# Patient Record
Sex: MALE | Race: WHITE | NOT HISPANIC OR LATINO
[De-identification: names, ages, dates, MRNs, and addresses within clinical notes are randomized per-mention and may not be internally consistent; named-entity substitution may affect disease eponyms.]

---

## 2023-12-11 PROBLEM — Z00.00 ENCOUNTER FOR PREVENTIVE HEALTH EXAMINATION: Status: ACTIVE | Noted: 2023-12-11

## 2023-12-18 ENCOUNTER — APPOINTMENT (OUTPATIENT)
Dept: FAMILY MEDICINE | Facility: CLINIC | Age: 78
End: 2023-12-18
Payer: MEDICARE

## 2023-12-18 ENCOUNTER — NON-APPOINTMENT (OUTPATIENT)
Age: 78
End: 2023-12-18

## 2023-12-18 VITALS
HEIGHT: 70 IN | SYSTOLIC BLOOD PRESSURE: 91 MMHG | OXYGEN SATURATION: 96 % | HEART RATE: 82 BPM | RESPIRATION RATE: 17 BRPM | TEMPERATURE: 98.3 F | BODY MASS INDEX: 29.78 KG/M2 | DIASTOLIC BLOOD PRESSURE: 60 MMHG | WEIGHT: 208 LBS

## 2023-12-18 DIAGNOSIS — N42.9 DISORDER OF PROSTATE, UNSPECIFIED: ICD-10-CM

## 2023-12-18 DIAGNOSIS — E78.00 PURE HYPERCHOLESTEROLEMIA, UNSPECIFIED: ICD-10-CM

## 2023-12-18 DIAGNOSIS — I44.7 LEFT BUNDLE-BRANCH BLOCK, UNSPECIFIED: ICD-10-CM

## 2023-12-18 DIAGNOSIS — N13.9 OBSTRUCTIVE AND REFLUX UROPATHY, UNSPECIFIED: ICD-10-CM

## 2023-12-18 DIAGNOSIS — R91.8 OTHER NONSPECIFIC ABNORMAL FINDING OF LUNG FIELD: ICD-10-CM

## 2023-12-18 DIAGNOSIS — Z12.11 ENCOUNTER FOR SCREENING FOR MALIGNANT NEOPLASM OF COLON: ICD-10-CM

## 2023-12-18 DIAGNOSIS — N20.0 CALCULUS OF KIDNEY: ICD-10-CM

## 2023-12-18 DIAGNOSIS — I51.7 CARDIOMEGALY: ICD-10-CM

## 2023-12-18 DIAGNOSIS — G47.00 INSOMNIA, UNSPECIFIED: ICD-10-CM

## 2023-12-18 DIAGNOSIS — Z86.79 PERSONAL HISTORY OF OTHER DISEASES OF THE CIRCULATORY SYSTEM: ICD-10-CM

## 2023-12-18 DIAGNOSIS — T63.331A: ICD-10-CM

## 2023-12-18 DIAGNOSIS — M79.89 OTHER SPECIFIED SOFT TISSUE DISORDERS: ICD-10-CM

## 2023-12-18 PROCEDURE — 99387 INIT PM E/M NEW PAT 65+ YRS: CPT | Mod: GY

## 2023-12-18 RX ORDER — ALBUTEROL SULFATE 90 UG/1
108 (90 BASE) INHALANT RESPIRATORY (INHALATION)
Qty: 3 | Refills: 3 | Status: ACTIVE | COMMUNITY
Start: 2023-12-18 | End: 1900-01-01

## 2023-12-18 RX ORDER — FLUTICASONE FUROATE, UMECLIDINIUM BROMIDE AND VILANTEROL TRIFENATATE 200; 62.5; 25 UG/1; UG/1; UG/1
200-62.5-25 POWDER RESPIRATORY (INHALATION) DAILY
Qty: 1 | Refills: 3 | Status: ACTIVE | COMMUNITY
Start: 2023-12-18 | End: 1900-01-01

## 2023-12-18 RX ORDER — ROSUVASTATIN CALCIUM 10 MG/1
10 TABLET, FILM COATED ORAL
Qty: 90 | Refills: 3 | Status: ACTIVE | COMMUNITY
Start: 2023-12-18 | End: 1900-01-01

## 2023-12-21 NOTE — HISTORY OF PRESENT ILLNESS
[de-identified] : 78-year-old male presents for establishment of care for health maintenance and physical examination. 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 Patient is a returning resident from [place] who recently lost his wife and moved back. Patient suffers from discomfort in foot due to prior ankle fusion surgery, recurrent neuropathy. He treats most his ailments as needed. The patient is also having sleep related issues and taking pills for the same. He also suffers from high cholesterol for which he takes medication. Patient had a brain bleed in 2015 for which he spent a week in the ICU. He also has a history of transitional zone prostate disease. Patient also reported a history of gout. Patient underwent a PET scan indicating two lung nodules that need CT Chest in three months. Report suggests a 2.0 cm focus of moderate abnormal FDG uptake mid gland right transitional zone prostate, correlate with PSA and Prostate MRI. Patient also has suggestion to obtain a renal ultrasound or MRI suggested due to a 3.7 cm septated cyst in the posterior mid to lower pole cortex of the right kidney.

## 2024-01-04 ENCOUNTER — RESULT REVIEW (OUTPATIENT)
Age: 79
End: 2024-01-04

## 2024-01-04 ENCOUNTER — OUTPATIENT (OUTPATIENT)
Dept: OUTPATIENT SERVICES | Facility: HOSPITAL | Age: 79
LOS: 1 days | End: 2024-01-04
Payer: MEDICARE

## 2024-01-04 ENCOUNTER — APPOINTMENT (OUTPATIENT)
Dept: MRI IMAGING | Facility: CLINIC | Age: 79
End: 2024-01-04
Payer: MEDICARE

## 2024-01-04 DIAGNOSIS — N13.9 OBSTRUCTIVE AND REFLUX UROPATHY, UNSPECIFIED: ICD-10-CM

## 2024-01-04 DIAGNOSIS — N42.89 OTHER SPECIFIED DISORDERS OF PROSTATE: ICD-10-CM

## 2024-01-04 DIAGNOSIS — N42.9 DISORDER OF PROSTATE, UNSPECIFIED: ICD-10-CM

## 2024-01-04 DIAGNOSIS — R97.20 ELEVATED PROSTATE SPECIFIC ANTIGEN [PSA]: ICD-10-CM

## 2024-01-04 PROCEDURE — A9585: CPT

## 2024-01-04 PROCEDURE — 72197 MRI PELVIS W/O & W/DYE: CPT

## 2024-01-04 PROCEDURE — 76498P: CUSTOM | Mod: 26

## 2024-01-04 PROCEDURE — 72197 MRI PELVIS W/O & W/DYE: CPT | Mod: 26

## 2024-01-04 PROCEDURE — 76498 UNLISTED MR PROCEDURE: CPT

## 2024-01-08 ENCOUNTER — RX RENEWAL (OUTPATIENT)
Age: 79
End: 2024-01-08

## 2024-01-10 ENCOUNTER — RX RENEWAL (OUTPATIENT)
Age: 79
End: 2024-01-10

## 2024-01-12 ENCOUNTER — APPOINTMENT (OUTPATIENT)
Dept: CT IMAGING | Facility: HOSPITAL | Age: 79
End: 2024-01-12
Payer: MEDICARE

## 2024-01-12 ENCOUNTER — RESULT REVIEW (OUTPATIENT)
Age: 79
End: 2024-01-12

## 2024-01-12 ENCOUNTER — OUTPATIENT (OUTPATIENT)
Dept: OUTPATIENT SERVICES | Facility: HOSPITAL | Age: 79
LOS: 1 days | End: 2024-01-12
Payer: MEDICARE

## 2024-01-12 ENCOUNTER — APPOINTMENT (OUTPATIENT)
Dept: ULTRASOUND IMAGING | Facility: HOSPITAL | Age: 79
End: 2024-01-12
Payer: MEDICARE

## 2024-01-12 DIAGNOSIS — R91.8 OTHER NONSPECIFIC ABNORMAL FINDING OF LUNG FIELD: ICD-10-CM

## 2024-01-12 DIAGNOSIS — I51.7 CARDIOMEGALY: ICD-10-CM

## 2024-01-12 PROCEDURE — 76770 US EXAM ABDO BACK WALL COMP: CPT | Mod: 26

## 2024-01-12 PROCEDURE — 76770 US EXAM ABDO BACK WALL COMP: CPT

## 2024-01-12 PROCEDURE — 71250 CT THORAX DX C-: CPT | Mod: 26

## 2024-01-12 PROCEDURE — 71250 CT THORAX DX C-: CPT

## 2024-01-14 RX ORDER — TRAMADOL HYDROCHLORIDE 50 MG/1
50 TABLET, COATED ORAL
Qty: 30 | Refills: 0 | Status: ACTIVE | COMMUNITY
Start: 2023-12-18 | End: 1900-01-01

## 2024-01-17 LAB
ALBUMIN SERPL ELPH-MCNC: 4.1 G/DL
ALP BLD-CCNC: 69 U/L
ALT SERPL-CCNC: 14 U/L
ANION GAP SERPL CALC-SCNC: 10 MMOL/L
AST SERPL-CCNC: 20 U/L
BILIRUB SERPL-MCNC: 0.5 MG/DL
BUN SERPL-MCNC: 22 MG/DL
CALCIUM SERPL-MCNC: 9.4 MG/DL
CHLORIDE SERPL-SCNC: 105 MMOL/L
CHOLEST SERPL-MCNC: 174 MG/DL
CO2 SERPL-SCNC: 24 MMOL/L
CREAT SERPL-MCNC: 0.89 MG/DL
EGFR: 89 ML/MIN/1.73M2
GLUCOSE SERPL-MCNC: 103 MG/DL
HCT VFR BLD CALC: 44.3 %
HDLC SERPL-MCNC: 53 MG/DL
HGB BLD-MCNC: 14.4 G/DL
LDLC SERPL CALC-MCNC: 107 MG/DL
MCHC RBC-ENTMCNC: 29.8 PG
MCHC RBC-ENTMCNC: 32.5 GM/DL
MCV RBC AUTO: 91.5 FL
NONHDLC SERPL-MCNC: 121 MG/DL
PLATELET # BLD AUTO: 229 K/UL
POTASSIUM SERPL-SCNC: 4.8 MMOL/L
PROT SERPL-MCNC: 6.4 G/DL
PSA FREE FLD-MCNC: 20 %
PSA FREE SERPL-MCNC: 0.33 NG/ML
PSA SERPL-MCNC: 1.61 NG/ML
RBC # BLD: 4.84 M/UL
RBC # FLD: 13.2 %
SODIUM SERPL-SCNC: 139 MMOL/L
TRIGL SERPL-MCNC: 75 MG/DL
TSH SERPL-ACNC: 0.93 UIU/ML
WBC # FLD AUTO: 9.27 K/UL

## 2024-02-05 ENCOUNTER — APPOINTMENT (OUTPATIENT)
Dept: UROLOGY | Facility: CLINIC | Age: 79
End: 2024-02-05
Payer: MEDICARE

## 2024-02-05 VITALS
BODY MASS INDEX: 29.06 KG/M2 | OXYGEN SATURATION: 96 % | HEIGHT: 70 IN | WEIGHT: 203 LBS | DIASTOLIC BLOOD PRESSURE: 75 MMHG | TEMPERATURE: 97.9 F | SYSTOLIC BLOOD PRESSURE: 137 MMHG | HEART RATE: 62 BPM

## 2024-02-05 DIAGNOSIS — N28.1 CYST OF KIDNEY, ACQUIRED: ICD-10-CM

## 2024-02-05 DIAGNOSIS — Z86.39 PERSONAL HISTORY OF OTHER ENDOCRINE, NUTRITIONAL AND METABOLIC DISEASE: ICD-10-CM

## 2024-02-05 DIAGNOSIS — R35.1 NOCTURIA: ICD-10-CM

## 2024-02-05 DIAGNOSIS — Z86.69 PERSONAL HISTORY OF OTHER DISEASES OF THE NERVOUS SYSTEM AND SENSE ORGANS: ICD-10-CM

## 2024-02-05 PROCEDURE — 99204 OFFICE O/P NEW MOD 45 MIN: CPT

## 2024-02-05 NOTE — PHYSICAL EXAM
[Normal Appearance] : normal appearance [Well Groomed] : well groomed [General Appearance - In No Acute Distress] : no acute distress [Edema] : no peripheral edema [Respiration, Rhythm And Depth] : normal respiratory rhythm and effort [Exaggerated Use Of Accessory Muscles For Inspiration] : no accessory muscle use [Abdomen Soft] : soft [Abdomen Tenderness] : non-tender [Costovertebral Angle Tenderness] : no ~M costovertebral angle tenderness [Normal Station and Gait] : the gait and station were normal for the patient's age [] : no rash [No Focal Deficits] : no focal deficits [Oriented To Time, Place, And Person] : oriented to person, place, and time [Affect] : the affect was normal [Mood] : the mood was normal [No Palpable Adenopathy] : no palpable adenopathy [de-identified] : pvr- 100-115 ml  [de-identified] : uses cane

## 2024-02-05 NOTE — HISTORY OF PRESENT ILLNESS
[FreeTextEntry1] : patient here with son  was to see  last year but postponed due to wife death  reason for visit - push to void rhianna at night and relaxes to void - 2-3 x at night  no day issues with void , no dysuria, no hematuira , flow varies- feels empty after void  had to get cheslt ct scan last year and prostae was seen  f/u MRI of prostate done and was told abnl and needed f/u  denies any procedures or meds for protste no fh of prostate cancer

## 2024-02-05 NOTE — ASSESSMENT
[FreeTextEntry1] : patient with  chest PETS scan done ( neg for chest ) but abnl pelvis -- this led to MRI of prosate 1- refer to colleague for further eval of abn PIRAD 4 MRI wiht normal psa 1.6 and prostate vol 27 ml  2- abnl SOPHIE with complex cyst -- recommend CT +/- contrast and then if Bosniak 2 or 2 F - cont annual SOPHIE  3-LUTS -an no bilat hydro -- no meds needed - discussed UDS to evla bladder functino but not bothered - will cont to follow  4- check urine today

## 2024-02-06 LAB
APPEARANCE: CLEAR
BACTERIA: NEGATIVE /HPF
BILIRUBIN URINE: NEGATIVE
BLOOD URINE: NEGATIVE
CAST: 0 /LPF
COLOR: YELLOW
EPITHELIAL CELLS: 0 /HPF
GLUCOSE QUALITATIVE U: NEGATIVE MG/DL
KETONES URINE: NEGATIVE MG/DL
LEUKOCYTE ESTERASE URINE: NEGATIVE
MICROSCOPIC-UA: NORMAL
NITRITE URINE: NEGATIVE
PH URINE: 7
PROTEIN URINE: NORMAL MG/DL
RED BLOOD CELLS URINE: 2 /HPF
SPECIFIC GRAVITY URINE: 1.02
UROBILINOGEN URINE: 0.2 MG/DL
WHITE BLOOD CELLS URINE: 0 /HPF

## 2024-02-07 LAB — BACTERIA UR CULT: NORMAL

## 2024-02-12 ENCOUNTER — APPOINTMENT (OUTPATIENT)
Dept: CT IMAGING | Facility: HOSPITAL | Age: 79
End: 2024-02-12
Payer: MEDICARE

## 2024-02-12 ENCOUNTER — OUTPATIENT (OUTPATIENT)
Dept: OUTPATIENT SERVICES | Facility: HOSPITAL | Age: 79
LOS: 1 days | End: 2024-02-12
Payer: MEDICARE

## 2024-02-12 DIAGNOSIS — N28.1 CYST OF KIDNEY, ACQUIRED: ICD-10-CM

## 2024-02-12 PROCEDURE — 74170 CT ABD WO CNTRST FLWD CNTRST: CPT | Mod: 26

## 2024-02-12 PROCEDURE — 74170 CT ABD WO CNTRST FLWD CNTRST: CPT

## 2024-02-27 ENCOUNTER — APPOINTMENT (OUTPATIENT)
Dept: UROLOGY | Facility: CLINIC | Age: 79
End: 2024-02-27
Payer: MEDICARE

## 2024-02-27 VITALS
WEIGHT: 203 LBS | OXYGEN SATURATION: 96 % | BODY MASS INDEX: 29.06 KG/M2 | HEIGHT: 70 IN | RESPIRATION RATE: 17 BRPM | SYSTOLIC BLOOD PRESSURE: 133 MMHG | TEMPERATURE: 97.4 F | HEART RATE: 74 BPM | DIASTOLIC BLOOD PRESSURE: 77 MMHG

## 2024-02-27 PROCEDURE — 99214 OFFICE O/P EST MOD 30 MIN: CPT

## 2024-02-27 NOTE — HISTORY OF PRESENT ILLNESS
[FreeTextEntry1] : 79-year-old male who presents for follow-up of abnormal imaging  #Abnormal MRI Had a PET CT for a lung lesion in July 2023, which showed a hypermetabolic lesion in the prostate. This was in [place] in July - he has now moved to [place].  MRI prostate January 2024: 27 cc prostate, PI-RADS 4 lesion right anterior mid gland peripheral zone PSA January 2024: 1.61 Referred for consideration of biopsy No family history of prostate cancer Was in [place].  No known exposure to agent orange  #Renal cysts CT 2/2024 - Bosniak 2 cyst, 4/4 cm RK  #LUTS Nocturia x 2-3. Minimally bothered UA 2/2024 - 2 rbc/hpf

## 2024-02-27 NOTE — PHYSICAL EXAM
[Normal Appearance] : normal appearance [Edema] : no peripheral edema [] : no respiratory distress [Bowel Sounds] : normal bowel sounds [Abdomen Tenderness] : non-tender [Urethral Meatus] : meatus normal [Epididymis] : the epididymides were normal [Testes Tenderness] : no tenderness of the testes [Prostate Enlargement] : the prostate was not enlarged [Prostate Tenderness] : the prostate was not tender [No Prostate Nodules] : no prostate nodules

## 2024-02-27 NOTE — ASSESSMENT
[FreeTextEntry1] : 79-year male presents with the following:  #Abnormal MRI pelvis I discussed that the definitive way to evaluate for prostate cancer is to perform a prostate biopsy.  I described the procedure in detail.  I described that this involves placing a probe into the rectum, and then placing a biopsy needle through the skin between the scrotum and the anus (the perineum).  I discussed that we usually sample 12 different regions in the prostate, in addition to the suspicious lesion.  I described the risks of the procedure, including retention, pain, infection.  I described that while infection after prostate biopsy is a significant concern, using the transperineal technique decreases this risk. Also discussed the possibility of transient-worsening of his erections following this biopsy. At this time, he would like to proceed with a transperineal prostate biopsy - Schedule for TP fusion prostate biopsy  #Wade cyst - Annual US

## 2024-03-04 ENCOUNTER — APPOINTMENT (OUTPATIENT)
Dept: DERMATOLOGY | Facility: CLINIC | Age: 79
End: 2024-03-04
Payer: MEDICARE

## 2024-03-04 DIAGNOSIS — R21 RASH AND OTHER NONSPECIFIC SKIN ERUPTION: ICD-10-CM

## 2024-03-04 DIAGNOSIS — L20.9 ATOPIC DERMATITIS, UNSPECIFIED: ICD-10-CM

## 2024-03-04 PROCEDURE — 99204 OFFICE O/P NEW MOD 45 MIN: CPT

## 2024-03-04 NOTE — PHYSICAL EXAM
[FreeTextEntry3] : AAOx3, pleasant, NAD, no visual lymphadenopathy hair, scalp, face, nose, eyelids, ears, lips, oropharynx, neck, chest, abdomen, back, right arm, left arm, nails, and hands examined with all normal findings, pertinent findings include:  diffuse xerosis on arms and legs

## 2024-03-04 NOTE — ASSESSMENT
[FreeTextEntry1] : atopic dermatitis -education -gentle skin care reviewed TAC BID PRN; SEd discussed dupixent defers

## 2024-03-08 ENCOUNTER — OUTPATIENT (OUTPATIENT)
Dept: OUTPATIENT SERVICES | Facility: HOSPITAL | Age: 79
LOS: 1 days | End: 2024-03-08
Payer: SELF-PAY

## 2024-03-08 DIAGNOSIS — Z00.8 ENCOUNTER FOR OTHER GENERAL EXAMINATION: ICD-10-CM

## 2024-03-08 PROCEDURE — C8001: CPT

## 2024-03-13 ENCOUNTER — APPOINTMENT (OUTPATIENT)
Dept: NEUROLOGY | Facility: CLINIC | Age: 79
End: 2024-03-13
Payer: MEDICARE

## 2024-03-13 ENCOUNTER — EMERGENCY (EMERGENCY)
Facility: HOSPITAL | Age: 79
LOS: 1 days | Discharge: HOME | End: 2024-03-13
Attending: EMERGENCY MEDICINE | Admitting: STUDENT IN AN ORGANIZED HEALTH CARE EDUCATION/TRAINING PROGRAM
Payer: MEDICARE

## 2024-03-13 VITALS
BODY MASS INDEX: 29.06 KG/M2 | DIASTOLIC BLOOD PRESSURE: 80 MMHG | HEIGHT: 70 IN | SYSTOLIC BLOOD PRESSURE: 124 MMHG | WEIGHT: 203 LBS | OXYGEN SATURATION: 97 % | HEART RATE: 70 BPM | TEMPERATURE: 98.3 F

## 2024-03-13 VITALS
SYSTOLIC BLOOD PRESSURE: 152 MMHG | TEMPERATURE: 98 F | HEIGHT: 70 IN | RESPIRATION RATE: 17 BRPM | HEART RATE: 94 BPM | WEIGHT: 205.03 LBS | OXYGEN SATURATION: 98 % | DIASTOLIC BLOOD PRESSURE: 89 MMHG

## 2024-03-13 PROCEDURE — G2211 COMPLEX E/M VISIT ADD ON: CPT

## 2024-03-13 PROCEDURE — 99284 EMERGENCY DEPT VISIT MOD MDM: CPT

## 2024-03-13 PROCEDURE — 99283 EMERGENCY DEPT VISIT LOW MDM: CPT

## 2024-03-13 PROCEDURE — 99205 OFFICE O/P NEW HI 60 MIN: CPT

## 2024-03-13 RX ADMIN — Medication 100 MILLIGRAM(S): at 17:40

## 2024-03-13 NOTE — ED PROVIDER NOTE - CLINICAL SUMMARY MEDICAL DECISION MAKING FREE TEXT BOX
Cellulitis This patient presents with initial presentation of local erythema, warmth, swelling concerning for cellulitis. Sensitivity/pain to light touch around the erythematous area. No lymphangitic spread visible and no fluid pockets or fluctuance concerning for abscess noted. Low concern for osteomyelitis or DVT. No immune compromise, bullae, pain out of proportion, or rapid progression concerning for necrotizing fasciitis. [facility] with follow up with their PMD.

## 2024-03-13 NOTE — ED ADULT TRIAGE NOTE - SPO2 (%)
Dr Nicolette Hammans pt    Pharmacy called in to advise that Carafate script sent for this pt is for liquid, which is too expensive  They would like to know if tablets could be provided to patient since it is cheaper  Please advise  98

## 2024-03-13 NOTE — ED PROVIDER NOTE - NS ED ROS FT
Review of Systems:  	•	CONSTITUTIONAL - no fever, no diaphoresis, no weight change  	•	SKIN - no rash  	•	HEMATOLOGIC - no bleeding, no bruising  	•	EYES - no eye pain, no blurred vision  	•	ENT - no change in hearing, no pain  	•	RESPIRATORY - no shortness of breath, no cough  	•	CARDIAC - no chest pain, no palpitations  	•	GI - no abd pain, no nausea, no vomiting, no diarrhea, no constipation, no bleeding  	•	GENITO-URINARY -no dysuria; no hematuria   	•	MUSCULOSKELETAL - no joint paint, +swelling, +redness  	•	NEUROLOGIC - no weakness, no headache, no anesthesia, no paresthesias  	•	PSYCH - no anxiety, non suicidal, non homicidal, no hallucination, no depression

## 2024-03-13 NOTE — ED PROVIDER NOTE - PATIENT PORTAL LINK FT
You can access the FollowMyHealth Patient Portal offered by [facility] by registering at the following website: [email or web address]. By joining [facility]’s FollowMyHealth portal, you will also be able to view your health information using other applications (apps) compatible with our system.

## 2024-03-13 NOTE — ED PROVIDER NOTE - PHYSICAL EXAMINATION
ATTENDING PHYSICAL EXAM DR. NOEL ***GEN - NAD; well appearing; A+O x3 ***HEAD - NC/AT ***EYES/NOSE - PERRL, EOMI, mucous membranes moist, no discharge ***THROAT: Oral cavity and pharynx normal. No inflammation, swelling, exudate, or lesions.  ***NECK: Neck supple, non-tender without lymphadenopathy, no masses, no thyromegaly.   ***PULMONARY - CTA b/l, symmetric breath sounds. ***CARDIAC -s1s2, RRR, no M,G,R  ***ABDOMEN - +BS, ND, NT, soft, no guarding, no rebound, no masses   ***BACK - no CVA tenderness, Normal  spine ***EXTREMITIES - symmetric pulses, 2+ dp, capillary refill < 2 seconds, no clubbing, no cyanosis, +edema +erythema to medial aspect of lower right ext, above ankle, from ankle, no abscess, +edema without fluctuance ***SKIN - no rash or bruising   ***NEUROLOGIC - alert

## 2024-03-13 NOTE — DISCUSSION/SUMMARY
[FreeTextEntry1] : 79-year-old gentleman who is here for neuropathy however patient has likely abscess in the medial calf.  Patient was advised to go to the emergency room.  Emergency room physician was notified.  When patient returns for follow-up will manage his neuropathy.  In the interim we will have staff try to obtain the records from [place] physician.  I spent the time noted on the day of this patient encounter preparing for, review of medical records,review of pertinent diagnostic studies, providing and documenting the above E/M service and counseling and educate patient on differential, workup, disease course, and treatment/management. Education was provided to the patient during this encounter. All questions and concerns were answered and addressed in detail. The patient verbalized understanding and agreed to plan. Patient was advised to continue to monitor for neurologic symptoms and to notify my office or go to the nearest emergency room if there are any changes. Any orders/referrals and communications were provided as well. Side effects of the above medications were discussed in detail including but not limited to applicable black box warning and teratogenicity as appropriate. Patient was advised to bring previous records to my office.

## 2024-03-13 NOTE — HISTORY OF PRESENT ILLNESS
[FreeTextEntry1] : 79-year-old gentleman who came with no medical records.  Patient had his care previously in [place] and has longstanding neuropathy described as pins-and-needles pain that occurs in both heels.  Patient had a fracture on the right calf and needed bone fusion because it had calcified with the fracture displaced.  Patient has tried oxycodone, Lyrica, gabapentin.  The Lyrica 75 mg as needed along with tramadol 50 mg as needed.  Patient had gauze over his right calf and when he revealed underneath there was erythema and swelling along with white discharge at the wound site.  Patient was advised he should go to the ER for treatment of this and ER was notified.

## 2024-03-13 NOTE — PHYSICAL EXAM
[General Appearance - Alert] : alert [Oriented To Time, Place, And Person] : oriented to person, place, and time [Place] : oriented to place [Person] : oriented to person [Time] : oriented to time [Short Term Intact] : short term memory intact [Fluency] : fluency intact [Current Events] : adequate knowledge of current events [Cranial Nerves Optic (II)] : visual acuity intact bilaterally,  visual fields full to confrontation, pupils equal round and reactive to light [Cranial Nerves Facial (VII)] : face symmetrical [Cranial Nerves Oculomotor (III)] : extraocular motion intact [Cranial Nerves Vestibulocochlear (VIII)] : hearing was intact bilaterally [Cranial Nerves Accessory (XI - Cranial And Spinal)] : head turning and shoulder shrug symmetric [Cranial Nerves Hypoglossal (XII)] : there was no tongue deviation with protrusion [Motor Tone] : muscle tone was normal in all four extremities [Motor Strength] : muscle strength was normal in all four extremities

## 2024-03-13 NOTE — ED ADULT NURSE NOTE - NSFALLUNIVINTERV_ED_ALL_ED
Bed/Stretcher in lowest position, wheels locked, appropriate side rails in place/Call bell, personal items and telephone in reach/Instruct patient to call for assistance before getting out of bed/chair/stretcher/Non-slip footwear applied when patient is off stretcher/[place] to call system/Physically safe environment - no spills, clutter or unnecessary equipment/Purposeful proactive rounding/Room/bathroom lighting operational, light cord in reach

## 2024-03-13 NOTE — ED PROVIDER NOTE - NSFOLLOWUPINSTRUCTIONS_ED_ALL_ED_FT
Keep elevated is much as possible above the level of the heart, start antibiotics as prescribed.    English    Cellulitis, Adult  A person's legs and feet. One leg is normal and the other leg is affected by cellulitis.  Cellulitis is a skin infection. The infected area is often warm, red, swollen, and sore. It occurs most often on the legs, feet, and toes, but can happen on any part of the body.    This condition can be life-threatening without treatment. It is very important to get treated right away.    What are the causes?  This condition is caused by bacteria. The bacteria enter through a break in the skin, such as:  A cut.  A burn.  A bug bite.  An animal bite.  An open sore.  A crack.  What increases the risk?  Having a weak body's defense system (immune system).  Being older than 60 years old.  Having a blood sugar problem (diabetes).  Having a long-term liver disease (cirrhosis) or kidney disease.  Being very overweight (obese).  Having a skin problem, such as:  An itchy rash.  A rash caused by a fungus.  A rash with blisters.  Slow movement of blood in the veins (venous stasis).  Fluid buildup below the skin (edema).  This condition is more likely to occur in people who:  Have open cuts, burns, bites, or scrapes on the skin.  Have been treated with high-energy rays (radiation).  Use IV drugs.  What are the signs or symptoms?  Skin that:  Looks red or purple, or slightly darker than your usual skin color.  Has streaks.  Has spots.  Is swollen.  Is sore or painful when you touch it.  Is warm.  A fever.  Chills.  Blisters.  Tiredness (fatigue).  How is this treated?  Medicines to treat infections or allergies.  Rest.  Placing cold or warm cloths on the skin.  Staying in the hospital, if the condition is very bad. You may need medicines through an IV.  Follow these instructions at home:  Medicines    Take over-the-counter and prescription medicines only as told by your doctor.  If you were prescribed antibiotics, take them as told by your doctor. Do not stop using them even if you start to feel better.  General instructions    Drink enough fluid to keep your pee (urine) pale yellow.  Do not touch or rub the infected area.  Raise (elevate) the infected area above the level of your heart while you are sitting or lying down.  Return to your normal activities when your doctor says that it is safe.  Place cold or warm cloths on the area as told by your doctor.  Keep all follow-up visits. Your doctor will need to make sure that a more serious infection is not developing.  Contact a doctor if:  You have a fever.  You do not start to get better after 1–2 days of treatment.  Your bone or joint under the infected area starts to hurt after the skin has healed.  Your infection comes back in the same area or another area. Signs of this may include:  You have a swollen bump in the area.  Your red area gets larger, turns dark in color, or hurts more.  You have more fluid coming from the wound.  Pus or a bad smell develops in your infected area.  You have more pain.  You feel sick and have muscle aches and weakness.  You develop vomiting or watery poop that will not go away.  Get help right away if:  You see red streaks coming from the area.  You notice the skin turns purple or black and falls off.  These symptoms may be an emergency. Get help right away. Call 911.  Do not wait to see if the symptoms will go away.  Do not drive yourself to the hospital.  This information is not intended to replace advice given to you by your health care provider. Make sure you discuss any questions you have with your health care provider.    Document Revised: 08/15/2023 Document Reviewed: 08/15/2023  [employer] Patient Education © 2024 [employer]  [employer] logo  Terms and Conditions  Privacy Policy  Editorial Policy  All content on this site: Copyright © 2024 [employer], its licensors, and contributors. All rights are reserved, including those for text and data mining, AI training, and similar technologies. For all open access content, the Creative Commons licensing terms apply.  Cookies are used by this site. To decline or learn more, visit our Cookies page.  [employer]

## 2024-03-13 NOTE — ED ADULT TRIAGE NOTE - CHIEF COMPLAINT QUOTE
R ankle redness and swelling x 2 weeks, pt reports being sent in by PCP for possible wound infection.

## 2024-03-13 NOTE — ED ADULT NURSE NOTE - OBJECTIVE STATEMENT
Patient presents to ED with complaint of right calf pain and redness that he noticed several days prior. Alert and oriented x 4. No signs or symptoms of nausea, vomiting, dizziness or SOB.

## 2024-03-13 NOTE — ED PROVIDER NOTE - PROGRESS NOTE DETAILS
At the time of discharge the patient is at baseline physical, mental and behavioral state.  I instructed the patient to return to the emergency department with any alarming symptoms, any worsening symptoms, or any other concerning symptoms.  I instructed this patient to call their primary doctor today, to inform them of their visit to the emergency department, and to obtain a repeat evaluation from their primary doctor in the next 24 hours.  This patient understands and agrees with this plan for follow up and feels safe returning home.  At the time of discharge this patient remained in stable condition, remained in no acute distress, and their vital signs remained stable measured within normal limits.

## 2024-03-13 NOTE — ED PROVIDER NOTE - OBJECTIVE STATEMENT
79-year-old presents to the emergency department sent in by physician who is seeing him for the first time for neuropathy.  She noted that his his right lower extremity was possibly infected.  She was concerned about an abscess and sent him to the emergency department.  The patient states that he has always had edema in his lower extremity and in his legs are no different as far as the edema and the puffiness goes however there is increased warmth and redness around an area of open skin.  He denies any pain to the extremity no poikilothermia no paresthesia or anesthesia no loss of function.  Patient states wound has been weeping clear fluid

## 2024-03-19 NOTE — CHART NOTE - NSCHARTNOTEFT_GEN_A_CORE
79 y o male presenting to the ED for would check.  SW made a follow up call to assist with scheduling the recommended primary care appointment.  The patient reported that the wound is healing and will follow up with his primary care at a later date.  Contact information was provided for further assistance if required.

## 2024-03-20 ENCOUNTER — APPOINTMENT (OUTPATIENT)
Dept: OTOLARYNGOLOGY | Facility: CLINIC | Age: 79
End: 2024-03-20
Payer: MEDICARE

## 2024-03-20 VITALS — OXYGEN SATURATION: 97 % | TEMPERATURE: 97.5 F | HEART RATE: 61 BPM

## 2024-03-20 PROBLEM — I73.9 PERIPHERAL VASCULAR DISEASE, UNSPECIFIED: Chronic | Status: ACTIVE | Noted: 2024-03-14

## 2024-03-20 PROCEDURE — 99203 OFFICE O/P NEW LOW 30 MIN: CPT | Mod: 25

## 2024-03-20 PROCEDURE — 69210 REMOVE IMPACTED EAR WAX UNI: CPT

## 2024-03-20 RX ORDER — FLUTICASONE PROPIONATE 50 MCG
SPRAY, SUSPENSION NASAL
Refills: 0 | Status: ACTIVE | COMMUNITY

## 2024-03-20 NOTE — CONSULT LETTER
[Dear  ___] : Dear  [unfilled], [Courtesy Letter:] : I had the pleasure of seeing your patient, [unfilled], in my office today. [Please see my note below.] : Please see my note below. [FreeTextEntry2] : Oswaldo Antoine MD ([facility]) [Sincerely,] : Sincerely, [FreeTextEntry3] : Iman Bryant, [id number], PABradC Sr. Physician Assistant, Otolaryngology at [facility] Adjunct Clinical Instructor, Department of Physician Assistant Studies, [address] [address]

## 2024-03-20 NOTE — ASSESSMENT
[FreeTextEntry1] : -  Cerumen impaction removed with minimal difficulty -  Follow up in 3 months for ear check. -  Follow up with audiology for hearing aid maintenance.

## 2024-03-20 NOTE — PHYSICAL EXAM
[de-identified] : +cerumen impaction bilaterally - removed. [Midline] : trachea located in midline position [Normal] : no rashes

## 2024-03-20 NOTE — REASON FOR VISIT
[Initial Consultation] : an initial consultation for [FreeTextEntry2] : cerumen buildup [Hearing Loss] : hearing loss

## 2024-03-25 ENCOUNTER — NON-APPOINTMENT (OUTPATIENT)
Age: 79
End: 2024-03-25

## 2024-03-27 ENCOUNTER — OUTPATIENT (OUTPATIENT)
Dept: OUTPATIENT SERVICES | Facility: HOSPITAL | Age: 79
LOS: 1 days | End: 2024-03-27
Payer: MEDICARE

## 2024-03-27 ENCOUNTER — APPOINTMENT (OUTPATIENT)
Dept: UROLOGY | Facility: CLINIC | Age: 79
End: 2024-03-27
Payer: MEDICARE

## 2024-03-27 ENCOUNTER — APPOINTMENT (OUTPATIENT)
Dept: UROLOGY | Facility: CLINIC | Age: 79
End: 2024-03-27

## 2024-03-27 VITALS — DIASTOLIC BLOOD PRESSURE: 71 MMHG | HEART RATE: 62 BPM | SYSTOLIC BLOOD PRESSURE: 129 MMHG

## 2024-03-27 VITALS — HEART RATE: 60 BPM | SYSTOLIC BLOOD PRESSURE: 153 MMHG | DIASTOLIC BLOOD PRESSURE: 73 MMHG

## 2024-03-27 DIAGNOSIS — R35.0 FREQUENCY OF MICTURITION: ICD-10-CM

## 2024-03-27 PROCEDURE — 55700: CPT

## 2024-03-27 PROCEDURE — 76999F: CUSTOM | Mod: 26

## 2024-03-27 PROCEDURE — 76999 ECHO EXAMINATION PROCEDURE: CPT

## 2024-03-28 ENCOUNTER — NON-APPOINTMENT (OUTPATIENT)
Age: 79
End: 2024-03-28

## 2024-03-28 DIAGNOSIS — R93.5 ABNORMAL FINDINGS ON DIAGNOSTIC IMAGING OF OTHER ABDOMINAL REGIONS, INCLUDING RETROPERITONEUM: ICD-10-CM

## 2024-04-02 LAB — CORE LAB BIOPSY: NORMAL

## 2024-04-11 ENCOUNTER — APPOINTMENT (OUTPATIENT)
Dept: UROLOGY | Facility: CLINIC | Age: 79
End: 2024-04-11
Payer: MEDICARE

## 2024-04-11 VITALS
TEMPERATURE: 97.5 F | SYSTOLIC BLOOD PRESSURE: 118 MMHG | HEIGHT: 70 IN | OXYGEN SATURATION: 92 % | WEIGHT: 203 LBS | DIASTOLIC BLOOD PRESSURE: 64 MMHG | RESPIRATION RATE: 17 BRPM | BODY MASS INDEX: 29.06 KG/M2 | HEART RATE: 72 BPM

## 2024-04-11 PROCEDURE — 99214 OFFICE O/P EST MOD 30 MIN: CPT

## 2024-04-12 NOTE — REASON FOR VISIT
Male
[Consideration of Curative Therapy] : consideration of curative therapy for prostate cancer
[Consideration of Curative Therapy] : consideration of curative therapy for prostate cancer

## 2024-04-12 NOTE — HISTORY OF PRESENT ILLNESS
[FreeTextEntry1] : 79-year-old male who presents for follow-up  Initially seen after PET/CT demonstrated a lesion in the prostate  MRI prostate January 2024: 27 cc prostate, PI-RADS 4 lesion right anterior mid gland peripheral zone  PSA January 2024: 1.61  Prostate biopsy March 24: Sandy 3+4 equal 7 prostate cancer in target lesion only

## 2024-04-12 NOTE — DISEASE MANAGEMENT
[1] : T1 [c] : c [X] : MX [0-10] : 0 -10 ng/mL [Biopsy with Fusion] : Patient had a biopsy with fusion on [7(3+4)] : Fusion Biopsy Benton Score: 7(3+4) [] : Patient had a Prostate MRI [4] : 4 [BiopsyDate] : 03/24 [IIB] : IIB

## 2024-04-12 NOTE — DISEASE MANAGEMENT
[1] : T1 [c] : c [X] : MX [0-10] : 0 -10 ng/mL [Biopsy with Fusion] : Patient had a biopsy with fusion on [7(3+4)] : Fusion Biopsy Edcouch Score: 7(3+4) [] : Patient had a Prostate MRI [4] : 4 [BiopsyDate] : 03/24 [IIB] : IIB

## 2024-04-22 ENCOUNTER — APPOINTMENT (OUTPATIENT)
Dept: OTOLARYNGOLOGY | Facility: CLINIC | Age: 79
End: 2024-04-22

## 2024-05-01 ENCOUNTER — RX RENEWAL (OUTPATIENT)
Age: 79
End: 2024-05-01

## 2024-05-01 ENCOUNTER — NON-APPOINTMENT (OUTPATIENT)
Age: 79
End: 2024-05-01

## 2024-05-07 ENCOUNTER — NON-APPOINTMENT (OUTPATIENT)
Age: 79
End: 2024-05-07

## 2024-05-08 ENCOUNTER — APPOINTMENT (OUTPATIENT)
Dept: NEUROLOGY | Facility: CLINIC | Age: 79
End: 2024-05-08
Payer: MEDICARE

## 2024-05-08 VITALS
TEMPERATURE: 98.1 F | DIASTOLIC BLOOD PRESSURE: 89 MMHG | SYSTOLIC BLOOD PRESSURE: 160 MMHG | BODY MASS INDEX: 29.35 KG/M2 | WEIGHT: 205 LBS | OXYGEN SATURATION: 97 % | HEART RATE: 66 BPM | HEIGHT: 70 IN

## 2024-05-08 PROCEDURE — G2211 COMPLEX E/M VISIT ADD ON: CPT

## 2024-05-08 PROCEDURE — 99215 OFFICE O/P EST HI 40 MIN: CPT

## 2024-05-08 NOTE — PHYSICAL EXAM
[General Appearance - Alert] : alert [Oriented To Time, Place, And Person] : oriented to person, place, and time [Person] : oriented to person [Place] : oriented to place [Time] : oriented to time [Short Term Intact] : short term memory intact [Fluency] : fluency intact [Current Events] : adequate knowledge of current events [Cranial Nerves Optic (II)] : visual acuity intact bilaterally,  visual fields full to confrontation, pupils equal round and reactive to light [Cranial Nerves Oculomotor (III)] : extraocular motion intact [Cranial Nerves Facial (VII)] : face symmetrical [Cranial Nerves Vestibulocochlear (VIII)] : hearing was intact bilaterally [Cranial Nerves Accessory (XI - Cranial And Spinal)] : head turning and shoulder shrug symmetric [Cranial Nerves Hypoglossal (XII)] : there was no tongue deviation with protrusion [Motor Tone] : muscle tone was normal in all four extremities [Motor Strength] : muscle strength was normal in all four extremities [Romberg's Sign] : a positive Romberg's sign was present [Dysesthesia] : dysesthesia was present [Hyperesthesia] : hyperesthesia was present [Coordination - Dysmetria Impaired Finger-to-Nose Bilateral] : not present [1+] : Biceps left 1+ [0] : Patella left 0 [FreeTextEntry7] : Decreased to the mid shins bilaterally of light touch and pinprick. [FreeTextEntry8] : Wide based gait

## 2024-05-08 NOTE — HISTORY OF PRESENT ILLNESS
[FreeTextEntry1] : 79-year-old gentleman who came with no medical records.  Patient had his care previously in [place] and has longstanding neuropathy described as pins-and-needles pain that occurs in both heels.  Patient had a fracture on the right calf and needed bone fusion because it had calcified with the fracture displaced.  Patient has tried oxycodone, Lyrica, gabapentin.  The Lyrica 75 mg as needed along with tramadol 50 mg as needed.  Patient had gauze over his right calf and when he revealed underneath there was erythema and swelling along with white discharge at the wound site.  Patient was advised he should go to the ER for treatment of this and ER was notified.  Interval history May 2024: Patient's leg wound has improved after starting antibiotics that were prescribed by the ER.  Patient states that he likely has a wound from a spider bite.  Patient states that over the past 5 years patient has experienced electrical sensation in the bottom of both feet.  It started with pins-and-needles and it progressed.  Patient saw a neurologist in [place] and was prescribed pregabalin 75 mg as needed.  Patient was advised that he should try the Lyrica 75 mg on a daily basis.  Patient was also on oxycodone for the neuropathy but has since stopped.  Patient also has peripheral edema worse on the right side from varicose veins which have been removed.  Patient continues to have right leg edema.  Patient's paperwork from the [employer] were incomplete.

## 2024-05-08 NOTE — DISCUSSION/SUMMARY
[FreeTextEntry1] : 79-year-old gentleman who is here for initial consultation of likely peripheral neuropathy from common causes.  Will check reversible causes of neuropathy through blood work.  Other contributing factors of his symptoms are from the peripheral vascular disease and the constant pitting edema on his legs.  Patient was advised that he should take the pregabalin 75 mg on a regular basis instead of as needed.  Patient will follow-up with me after the blood work is completed.  I spent the time noted on the day of this patient encounter preparing for, review of medical records,review of pertinent diagnostic studies, providing and documenting the above E/M service and counseling and educate patient on differential, workup, disease course, and treatment/management. Education was provided to the patient during this encounter. All questions and concerns were answered and addressed in detail. The patient verbalized understanding and agreed to plan. Patient was advised to continue to monitor for neurologic symptoms and to notify my office or go to the nearest emergency room if there are any changes. Any orders/referrals and communications were provided as well. Side effects of the above medications were discussed in detail including but not limited to applicable black box warning and teratogenicity as appropriate. Patient was advised to bring previous records to my office.

## 2024-05-09 ENCOUNTER — LABORATORY RESULT (OUTPATIENT)
Age: 79
End: 2024-05-09

## 2024-05-09 LAB
25(OH)D3 SERPL-MCNC: 22.9 NG/ML
AFP-TM SERPL-MCNC: 2.6 NG/ML
ALBUMIN SERPL ELPH-MCNC: 4.2 G/DL
ALP BLD-CCNC: 64 U/L
ALT SERPL-CCNC: 16 U/L
ANION GAP SERPL CALC-SCNC: 11 MMOL/L
AST SERPL-CCNC: 24 U/L
BILIRUB SERPL-MCNC: 0.4 MG/DL
BUN SERPL-MCNC: 21 MG/DL
CALCIUM SERPL-MCNC: 9.2 MG/DL
CHLORIDE SERPL-SCNC: 105 MMOL/L
CO2 SERPL-SCNC: 26 MMOL/L
CREAT SERPL-MCNC: 0.87 MG/DL
DEPRECATED KAPPA LC FREE/LAMBDA SER: 1.15 RATIO
EGFR: 89 ML/MIN/1.73M2
FOLATE SERPL-MCNC: >20 NG/ML
GLUCOSE SERPL-MCNC: 93 MG/DL
KAPPA LC CSF-MCNC: 1.37 MG/DL
KAPPA LC SERPL-MCNC: 1.58 MG/DL
LDH SERPL-CCNC: 199 U/L
POTASSIUM SERPL-SCNC: 4.8 MMOL/L
PROT SERPL-MCNC: 6.3 G/DL
SODIUM SERPL-SCNC: 142 MMOL/L
T4 SERPL-MCNC: 6.3 UG/DL
TSH SERPL-ACNC: 0.81 UIU/ML
VIT B12 SERPL-MCNC: 382 PG/ML

## 2024-05-10 LAB
HBV CORE IGG+IGM SER QL: NONREACTIVE
HBV SURFACE AB SER QL: NONREACTIVE
HBV SURFACE AG SER QL: NONREACTIVE
HCV AB SER QL: NONREACTIVE
HCV S/CO RATIO: 0.1 S/CO
T PALLIDUM AB SER QL IA: NEGATIVE

## 2024-05-13 LAB
ARSENIC, BLOOD: <10 NG/ML
CRYOGLOB SERPL-MCNC: NEGATIVE
GLIADIN IGA SER QL: 15.6 UNITS
GLIADIN IGG SER QL: 5.5 UNITS
GLIADIN PEPTIDE IGA SER-ACNC: NEGATIVE
GLIADIN PEPTIDE IGG SER-ACNC: NEGATIVE
LEAD BLD-MCNC: 2.3 UG/DL

## 2024-05-14 ENCOUNTER — APPOINTMENT (OUTPATIENT)
Dept: UROLOGY | Facility: CLINIC | Age: 79
End: 2024-05-14
Payer: MEDICARE

## 2024-05-14 VITALS — DIASTOLIC BLOOD PRESSURE: 79 MMHG | SYSTOLIC BLOOD PRESSURE: 146 MMHG | OXYGEN SATURATION: 95 % | HEART RATE: 60 BPM

## 2024-05-14 DIAGNOSIS — R93.5 ABNORMAL FINDINGS ON DIAGNOSTIC IMAGING OF OTHER ABDOMINAL REGIONS, INCLUDING RETROPERITONEUM: ICD-10-CM

## 2024-05-14 LAB
A-TOCOPHEROL VIT E SERPL-MCNC: 9.3 MG/L
ALBUMIN MFR SERPL ELPH: 61 %
ALBUMIN SERPL-MCNC: 3.8 G/DL
ALBUMIN/GLOB SERPL: 1.5 RATIO
ALBUPE: 16.7 %
ALPHA1 GLOB MFR SERPL ELPH: 3.9 %
ALPHA1 GLOB SERPL ELPH-MCNC: 0.2 G/DL
ALPHA1UPE: 30 %
ALPHA2 GLOB MFR SERPL ELPH: 10.5 %
ALPHA2 GLOB SERPL ELPH-MCNC: 0.7 G/DL
ALPHA2UPE: 21.2 %
B-GLOBULIN MFR SERPL ELPH: 12.5 %
B-GLOBULIN SERPL ELPH-MCNC: 0.8 G/DL
B2 MICROGLOB 24H UR-MCNC: 296 UG/L
BENCE JONES EXCRETION: 0 MG/24HR
BETA+GAMMA TOCOPHEROL SERPL-MCNC: 1.5 MG/L
BETAUPE: 16.2 %
CREAT 24H UR-MCNC: 1.4 G/24 H
CREATININE UR (MAYO): 68 MG/DL
GAMMA GLOB FLD ELPH-MCNC: 0.8 G/DL
GAMMA GLOB MFR SERPL ELPH: 12.1 %
GAMMAUPE: 15.9 %
IGA 24H UR QL IFE: NORMAL
INTERPRETATION SERPL IEP-IMP: NORMAL
KAPPA LC 24H UR QL: 0 MG/DL
M PROTEIN 24H MFR UR ELPH: 0 %
M PROTEIN SPEC IFE-MCNC: NORMAL
PROT ?TM UR-MCNC: 24 HR
PROT PATTERN 24H UR ELPH-IMP: NORMAL
PROT SERPL-MCNC: 6.3 G/DL
PROT SERPL-MCNC: 6.3 G/DL
PROT UR-MCNC: 10 MG/DL
PROT UR-MCNC: 10 MG/DL
SPECIMEN VOL 24H UR: 2025 ML
U PROTEIN QNT CALCULATION: 202 MG/24 H
VGCC-P/Q BIND AB SER-SCNC: 0 PMOL/L
VIT B6 SERPL-MCNC: 9 UG/L
ZINC SERPL-MCNC: 82 UG/DL

## 2024-05-14 PROCEDURE — 99215 OFFICE O/P EST HI 40 MIN: CPT

## 2024-05-14 PROCEDURE — G2211 COMPLEX E/M VISIT ADD ON: CPT

## 2024-05-14 NOTE — HISTORY OF PRESENT ILLNESS
Called patient to further discuss.  Went to voicemail.  Left message for callback. Office number provided.    [FreeTextEntry1] : MARKEL CASTRO returns to the office today. He is a 79 year-old man who underwent a prostate biopsy with Dr. Matthew in March which showed [place] 3+4 in the MRI target.   He was initially seen after a PET/CT demonstrated a lesion in the prostate. MRI of the prostate was performed in January which showed a prostate volume of 27cc and a PI-RADS 4 lesion right anterior mid gland peripheral zone.  The MRI shows no evidence of extracapsular extension, seminal vesicle involvement or pelvic adenopathy.  Prostate biopsy March 24: [place] 3+4 equal 7 prostate cancer in target lesion only.  No cancer was seen in any of his template biopsy samples.

## 2024-05-14 NOTE — DISEASE MANAGEMENT
[1] : T1 [c] : c [X] : MX [0-10] : 0 -10 ng/mL [Biopsy with Fusion] : Patient had a biopsy with fusion on [7(3+4)] : Fusion Biopsy Sun[place] Score: 7(3+4) [] : Patient had a Prostate MRI [4] : 4 [IIB] : IIB [Biopsy results sent to PCP/Referring Physician] : Biopsy results sent to PCP/Referring Physician [BiopsyDate] : 03/24 [MeasuredProstateVolume] : 27 [TotalCores] : 13 [TotalPositiveCores] : 1 [MaxCoreInvolvement] : 90

## 2024-05-14 NOTE — LETTER CLOSING
[FreeTextEntry3] : Sincerely,      Oswaldo Jones MD, FACS Director of Urology Services, [facility] Chief of Urology, [facility]  of Urology   [facility], [place] [id number] P: [phone] F: [phone] [facility]

## 2024-05-14 NOTE — LETTER GREETING
[Dear  ___] : Dear  [unfilled], [Follow-Up] : Your patient, [unfilled] was seen in my office today for follow-up [Please see my note below.] : Please see my note below. [FreeTextEntry2] : Oswaldo [place] [address]

## 2024-05-15 ENCOUNTER — RX RENEWAL (OUTPATIENT)
Age: 79
End: 2024-05-15

## 2024-05-15 LAB — MERCURY BLD-MCNC: <1 UG/L

## 2024-05-15 RX ORDER — PREGABALIN 75 MG/1
75 CAPSULE ORAL
Qty: 180 | Refills: 1 | Status: ACTIVE | COMMUNITY
Start: 2023-12-18 | End: 1900-01-01

## 2024-05-16 LAB
GQ1B AB IGG: NORMAL TITER
HU AB SER QL: NEGATIVE
PURKINJE CELLS AB SER QL IF: NEGATIVE

## 2024-05-17 LAB — COPPER SERPL-MCNC: 108 UG/DL

## 2024-05-20 LAB — MAG AB SER QL: NEGATIVE

## 2024-05-21 DIAGNOSIS — M10.9 GOUT, UNSPECIFIED: ICD-10-CM

## 2024-05-28 ENCOUNTER — APPOINTMENT (OUTPATIENT)
Dept: PULMONOLOGY | Facility: CLINIC | Age: 79
End: 2024-05-28
Payer: MEDICARE

## 2024-05-28 VITALS
BODY MASS INDEX: 30.78 KG/M2 | TEMPERATURE: 97.5 F | SYSTOLIC BLOOD PRESSURE: 140 MMHG | HEIGHT: 70 IN | WEIGHT: 215 LBS | HEART RATE: 66 BPM | DIASTOLIC BLOOD PRESSURE: 80 MMHG | OXYGEN SATURATION: 95 %

## 2024-05-28 DIAGNOSIS — R93.89 ABNORMAL FINDINGS ON DIAGNOSTIC IMAGING OF OTHER SPECIFIED BODY STRUCTURES: ICD-10-CM

## 2024-05-28 DIAGNOSIS — J45.30 MILD PERSISTENT ASTHMA, UNCOMPLICATED: ICD-10-CM

## 2024-05-28 PROCEDURE — G2211 COMPLEX E/M VISIT ADD ON: CPT

## 2024-05-28 PROCEDURE — 99204 OFFICE O/P NEW MOD 45 MIN: CPT

## 2024-05-28 RX ORDER — ALBUTEROL SULFATE 2.5 MG/3ML
(2.5 MG/3ML) SOLUTION RESPIRATORY (INHALATION)
Qty: 1 | Refills: 3 | Status: ACTIVE | COMMUNITY
Start: 2024-05-28 | End: 1900-01-01

## 2024-05-28 NOTE — PHYSICAL EXAM
[IV] : Mallampati Class: IV [Normal Appearance] : normal appearance [Supple] : supple [No Neck Mass] : no neck mass [No JVD] : no jvd [Normal Rate/Rhythm] : normal rate/rhythm [Normal S1, S2] : normal s1, s2 [No Murmurs] : no murmurs [No Resp Distress] : no resp distress [No Acc Muscle Use] : no acc muscle use [Clear to Auscultation Bilaterally] : clear to auscultation bilaterally [Benign] : benign [Not Tender] : not tender [No Masses] : no masses [Soft] : soft [No Hernias] : no hernias [No Clubbing] : no clubbing [No Edema] : no edema [No Rash] : no rash [No Motor Deficits] : no motor deficits [Normal Affect] : normal affect [TextBox_2] : obese male no distress no cough no sob [TextBox_11] : crowded airway  no lesion moist    [TextBox_99] : slow gait  cane

## 2024-05-28 NOTE — HISTORY OF PRESENT ILLNESS
[Former] : former [>= 20 pack years] : >= 20 pack years [Never] : never [TextBox_4] : moved   back from [place]   had     arielle OSHEA  asthma  5/28/2024 80y/o    male  [place]  ex smoker  (  35 pack years   16- up to 22pd  quit    1991 about )   [employer]  - s stationed     [employer] -   h/o prostate  cancer    -      will get   steam   treatment -  LBBB      ct   1/24   ground glass nodule   COPD/asthma -  mold mildew  allergy  -  yearly  ct   were done - on  trelegy and  albuterol  -flonase - uses cane due to   right ankle surgery    can ambulate  few  blocks no ches tpian no sob  -  [TextBox_11] : 1 [YearQuit] : 1991

## 2024-05-28 NOTE — REVIEW OF SYSTEMS
[Wheezing] : wheezing [Obesity] : obesity [Fever] : no fever [Recent Wt Gain (___ Lbs)] : ~T no recent weight gain [Chills] : no chills [Cough] : no cough [Hemoptysis] : no hemoptysis [Chest Tightness] : no chest tightness [Sputum] : no sputum [Dyspnea] : no dyspnea [SOB on Exertion] : no sob on exertion [Chest Discomfort] : no chest discomfort [Palpitations] : no palpitations [Seasonal Allergies] : no seasonal allergies [GERD] : no gerd [Abdominal Pain] : no abdominal pain [Nausea] : no nausea [Vomiting] : no vomiting [Dysphagia] : no dysphagia [Bleeding] : no bleeding [Chronic Pain] : no chronic pain [Rash] : no rash [Diabetes] : no diabetes [Thyroid Problem] : no thyroid problem

## 2024-05-28 NOTE — ASSESSMENT
[FreeTextEntry1] : 80y/o  male  1- ct  1/12/2024   lower lobe peripheral reticular opacities suggestive of scarring  mucoid impaction  right ground glass opacities   f/u 3- 6monghs   2- COPD/asthma 3- prostate  cancer 4- vaccinations  covid   flu        Recommendations 1- ct   chest f/u ordered and discussed  2- trelegy   200 qd  tolerated  3- albuterol MDI  two inh   q  6hour prn   4- PFT 5-  prevention discussed     -imaging reviewed  medications and   use reviewed  return after PFT

## 2024-06-03 LAB
SULFATIDE AB SER QL: NORMAL
SULFATIDE ANTIBODIES COMMENTS: NORMAL
SULFATIDE ANTIBODIES METHODS: NORMAL
SULFATIDE ANTIBODIES REFERENCES: NORMAL
SULFATIDE ANTIBODIES TECHNICAL RESULTS: NORMAL

## 2024-06-04 ENCOUNTER — OUTPATIENT (OUTPATIENT)
Dept: OUTPATIENT SERVICES | Facility: HOSPITAL | Age: 79
LOS: 1 days | End: 2024-06-04
Payer: MEDICARE

## 2024-06-04 ENCOUNTER — APPOINTMENT (OUTPATIENT)
Dept: CT IMAGING | Facility: HOSPITAL | Age: 79
End: 2024-06-04
Payer: MEDICARE

## 2024-06-04 DIAGNOSIS — R93.89 ABNORMAL FINDINGS ON DIAGNOSTIC IMAGING OF OTHER SPECIFIED BODY STRUCTURES: ICD-10-CM

## 2024-06-04 PROCEDURE — 71250 CT THORAX DX C-: CPT | Mod: 26

## 2024-06-04 PROCEDURE — 71250 CT THORAX DX C-: CPT

## 2024-06-10 ENCOUNTER — NON-APPOINTMENT (OUTPATIENT)
Age: 79
End: 2024-06-10

## 2024-06-10 ENCOUNTER — APPOINTMENT (OUTPATIENT)
Dept: OPHTHALMOLOGY | Facility: CLINIC | Age: 79
End: 2024-06-10
Payer: MEDICARE

## 2024-06-10 PROCEDURE — 92201 OPSCPY EXTND RTA DRAW UNI/BI: CPT

## 2024-06-10 PROCEDURE — 92134 CPTRZ OPH DX IMG PST SGM RTA: CPT

## 2024-06-10 PROCEDURE — 92004 COMPRE OPH EXAM NEW PT 1/>: CPT

## 2024-06-13 ENCOUNTER — OUTPATIENT (OUTPATIENT)
Dept: OUTPATIENT SERVICES | Facility: HOSPITAL | Age: 79
LOS: 1 days | End: 2024-06-13
Payer: MEDICARE

## 2024-06-13 DIAGNOSIS — J45.30 MILD PERSISTENT ASTHMA, UNCOMPLICATED: ICD-10-CM

## 2024-06-13 PROCEDURE — 94060 EVALUATION OF WHEEZING: CPT | Mod: 26

## 2024-06-13 PROCEDURE — 94060 EVALUATION OF WHEEZING: CPT

## 2024-06-13 PROCEDURE — 94729 DIFFUSING CAPACITY: CPT

## 2024-06-13 PROCEDURE — 94726 PLETHYSMOGRAPHY LUNG VOLUMES: CPT | Mod: 26

## 2024-06-13 PROCEDURE — 94729 DIFFUSING CAPACITY: CPT | Mod: 26

## 2024-06-13 PROCEDURE — 94726 PLETHYSMOGRAPHY LUNG VOLUMES: CPT

## 2024-06-19 ENCOUNTER — NON-APPOINTMENT (OUTPATIENT)
Age: 79
End: 2024-06-19

## 2024-06-19 ENCOUNTER — RX RENEWAL (OUTPATIENT)
Age: 79
End: 2024-06-19

## 2024-06-19 ENCOUNTER — APPOINTMENT (OUTPATIENT)
Dept: OTOLARYNGOLOGY | Facility: CLINIC | Age: 79
End: 2024-06-19

## 2024-06-19 VITALS — OXYGEN SATURATION: 96 % | TEMPERATURE: 97.5 F | HEART RATE: 65 BPM

## 2024-06-19 DIAGNOSIS — H91.90 UNSPECIFIED HEARING LOSS, UNSPECIFIED EAR: ICD-10-CM

## 2024-06-19 DIAGNOSIS — H61.20 IMPACTED CERUMEN, UNSPECIFIED EAR: ICD-10-CM

## 2024-06-19 PROCEDURE — 99213 OFFICE O/P EST LOW 20 MIN: CPT | Mod: 25

## 2024-06-19 PROCEDURE — 69210 REMOVE IMPACTED EAR WAX UNI: CPT | Mod: 50

## 2024-06-19 RX ORDER — ZOLPIDEM TARTRATE 10 MG/1
10 TABLET ORAL
Qty: 30 | Refills: 0 | Status: ACTIVE | COMMUNITY
Start: 2023-12-18 | End: 1900-01-01

## 2024-06-19 NOTE — PHYSICAL EXAM
[de-identified] : cerumen ; removed  [Midline] : trachea located in midline position [Normal] : no rashes

## 2024-06-19 NOTE — HISTORY OF PRESENT ILLNESS
[de-identified] : Mr. CASTRO is a 79 year-old male who presents with cerumen impaction and ear cleaning (3mo cleaning follow-up). Wears b/l hearing aids for hearing loss.  [Hearing Loss] : hearing loss

## 2024-06-19 NOTE — PROCEDURE
[Risk and Benefits Discussed] : The purpose, risks, discomforts, benefits and alternatives of the procedure have been explained to the patient including no treatment. [Cerumen Impaction] : Cerumen Impaction [Same] : same as the Pre Op Dx. [] : Removal of Cerumen [FreeTextEntry2] : removed  [FreeTextEntry6] : After informed verbal consent is obtained, cerumen is removed from b/l ear canal after being applied peroxide to loosen it. The cerumen was removed using a microscope and suctioning. The patient tolerated the procedure well.

## 2024-06-20 ENCOUNTER — OUTPATIENT (OUTPATIENT)
Dept: OUTPATIENT SERVICES | Facility: HOSPITAL | Age: 79
LOS: 1 days | End: 2024-06-20

## 2024-06-20 ENCOUNTER — APPOINTMENT (OUTPATIENT)
Dept: UROLOGY | Facility: CLINIC | Age: 79
End: 2024-06-20
Payer: MEDICARE

## 2024-06-20 VITALS
DIASTOLIC BLOOD PRESSURE: 84 MMHG | WEIGHT: 223.99 LBS | OXYGEN SATURATION: 98 % | RESPIRATION RATE: 18 BRPM | SYSTOLIC BLOOD PRESSURE: 158 MMHG | HEART RATE: 65 BPM | HEIGHT: 66 IN | TEMPERATURE: 97 F

## 2024-06-20 VITALS
RESPIRATION RATE: 16 BRPM | SYSTOLIC BLOOD PRESSURE: 175 MMHG | WEIGHT: 225 LBS | DIASTOLIC BLOOD PRESSURE: 76 MMHG | HEIGHT: 70 IN | OXYGEN SATURATION: 95 % | HEART RATE: 58 BPM | BODY MASS INDEX: 32.21 KG/M2 | TEMPERATURE: 97.6 F

## 2024-06-20 DIAGNOSIS — C61 MALIGNANT NEOPLASM OF PROSTATE: ICD-10-CM

## 2024-06-20 DIAGNOSIS — R33.9 RETENTION OF URINE, UNSPECIFIED: ICD-10-CM

## 2024-06-20 DIAGNOSIS — Z87.891 PERSONAL HISTORY OF NICOTINE DEPENDENCE: ICD-10-CM

## 2024-06-20 DIAGNOSIS — Z96.652 PRESENCE OF LEFT ARTIFICIAL KNEE JOINT: Chronic | ICD-10-CM

## 2024-06-20 DIAGNOSIS — Z98.1 ARTHRODESIS STATUS: Chronic | ICD-10-CM

## 2024-06-20 DIAGNOSIS — Z98.890 OTHER SPECIFIED POSTPROCEDURAL STATES: Chronic | ICD-10-CM

## 2024-06-20 DIAGNOSIS — Z63.4 DISAPPEARANCE AND DEATH OF FAMILY MEMBER: ICD-10-CM

## 2024-06-20 DIAGNOSIS — J45.909 UNSPECIFIED ASTHMA, UNCOMPLICATED: ICD-10-CM

## 2024-06-20 PROCEDURE — 99214 OFFICE O/P EST MOD 30 MIN: CPT

## 2024-06-20 PROCEDURE — G2211 COMPLEX E/M VISIT ADD ON: CPT

## 2024-06-20 RX ORDER — CIPROFLOXACIN HYDROCHLORIDE 500 MG/1
500 TABLET, FILM COATED ORAL
Qty: 6 | Refills: 0 | Status: ACTIVE | COMMUNITY
Start: 2024-06-20 | End: 1900-01-01

## 2024-06-20 RX ORDER — TAMSULOSIN HYDROCHLORIDE 0.4 MG/1
0.4 CAPSULE ORAL
Qty: 90 | Refills: 3 | Status: ACTIVE | COMMUNITY
Start: 2024-06-20 | End: 1900-01-01

## 2024-06-20 RX ORDER — TRIAMCINOLONE ACETONIDE 1 MG/G
0.1 CREAM TOPICAL TWICE DAILY
Qty: 1 | Refills: 1 | Status: DISCONTINUED | COMMUNITY
Start: 2024-03-04 | End: 2024-06-20

## 2024-06-20 RX ORDER — DIAZEPAM 5 MG/1
5 TABLET ORAL
Qty: 1 | Refills: 0 | Status: DISCONTINUED | COMMUNITY
Start: 2024-02-27 | End: 2024-06-20

## 2024-06-20 SDOH — SOCIAL STABILITY - SOCIAL INSECURITY: DISSAPEARANCE AND DEATH OF FAMILY MEMBER: Z63.4

## 2024-06-21 PROBLEM — M10.9 GOUT, UNSPECIFIED: Chronic | Status: ACTIVE | Noted: 2024-06-20

## 2024-06-21 PROBLEM — M19.90 UNSPECIFIED OSTEOARTHRITIS, UNSPECIFIED SITE: Chronic | Status: ACTIVE | Noted: 2024-06-20

## 2024-06-21 PROBLEM — C61 MALIGNANT NEOPLASM OF PROSTATE: Chronic | Status: ACTIVE | Noted: 2024-06-20

## 2024-06-21 PROBLEM — J45.909 UNSPECIFIED ASTHMA, UNCOMPLICATED: Chronic | Status: ACTIVE | Noted: 2024-06-20

## 2024-06-21 PROBLEM — Z86.79 PERSONAL HISTORY OF OTHER DISEASES OF THE CIRCULATORY SYSTEM: Chronic | Status: ACTIVE | Noted: 2024-06-20

## 2024-06-24 DIAGNOSIS — Z79.2 LONG TERM (CURRENT) USE OF ANTIBIOTICS: ICD-10-CM

## 2024-06-24 RX ORDER — CEPHALEXIN 500 MG/1
500 CAPSULE ORAL
Qty: 4 | Refills: 0 | Status: ACTIVE | COMMUNITY

## 2024-06-27 ENCOUNTER — APPOINTMENT (OUTPATIENT)
Dept: FAMILY MEDICINE | Facility: CLINIC | Age: 79
End: 2024-06-27
Payer: MEDICARE

## 2024-06-27 VITALS
DIASTOLIC BLOOD PRESSURE: 74 MMHG | TEMPERATURE: 97.7 F | BODY MASS INDEX: 31.92 KG/M2 | WEIGHT: 223 LBS | OXYGEN SATURATION: 95 % | SYSTOLIC BLOOD PRESSURE: 129 MMHG | HEIGHT: 70 IN | HEART RATE: 77 BPM | RESPIRATION RATE: 16 BRPM

## 2024-06-27 DIAGNOSIS — G62.9 POLYNEUROPATHY, UNSPECIFIED: ICD-10-CM

## 2024-06-27 DIAGNOSIS — Z96.652 PRESENCE OF LEFT ARTIFICIAL KNEE JOINT: ICD-10-CM

## 2024-06-27 DIAGNOSIS — C61 MALIGNANT NEOPLASM OF PROSTATE: ICD-10-CM

## 2024-06-27 PROCEDURE — G2211 COMPLEX E/M VISIT ADD ON: CPT

## 2024-06-27 PROCEDURE — 99214 OFFICE O/P EST MOD 30 MIN: CPT

## 2024-06-27 NOTE — DISEASE MANAGEMENT
[1] : T1 [c] : c [X] : MX [0-10] : 0 -10 ng/mL [Biopsy with Fusion] : Patient had a biopsy with fusion on [7(3+4)] : Fusion Biopsy [place] Score: 7(3+4) [Biopsy results sent to PCP/Referring Physician] : Biopsy results sent to PCP/Referring Physician [] : Patient had a Prostate MRI [4] : 4 [IIB] : IIB [BiopsyDate] : 03/24 [MeasuredProstateVolume] : 27 [TotalCores] : 13 [TotalPositiveCores] : 1 [MaxCoreInvolvement] : 90

## 2024-06-27 NOTE — PHYSICAL EXAM
[Normal] : Normal sphincter tone, no rectal mass, no prostate nodules, prostate not tender and not enlarged [No Prostate Nodules] : no prostate nodules [Normal] : normal external genitalia without lesions and no testicular masses [Normal External Genitalia] : normal external genitalia without lesions [FreeTextEntry1] : Prostate is smooth and symmetric, no enlargement noted of significance.  No nodularity or focal induration present.

## 2024-06-27 NOTE — LETTER GREETING
[Dear  ___] : Dear  [unfilled], [Follow-Up] : Your patient, [unfilled] was seen in my office today for follow-up [Please see my note below.] : Please see my note below. [FreeTextEntry2] : Oswaldo Anto[place] [address]

## 2024-06-27 NOTE — LETTER CLOSING
[FreeTextEntry3] : Sincerely,      Oswaldo Jones MD, FACS Director of Urology Services, [facility] Chief of Urology, [facility]  of Urology   [facility], [place] [id number] P: [phone] F: [phone] [facility]

## 2024-06-27 NOTE — HISTORY OF PRESENT ILLNESS
[FreeTextEntry1] : Jak Alexander presents to the office today.  He is 79 and was referred by Dr. Matthew for evaluation of a recently diagnosed prostate cancer, [place] 3+4 disease that was localized to an MRI target lesion.  His prostate measured 27 cm.  He had a PI-RADS category 4 lesion noted at the right mid gland anteriorly within the peripheral zone.  The MRI did not demonstrate evidence of any seminal vesicle abnormality or pelvic adenopathy.  There is no sign of any extracapsular extension.  His template prostate biopsy was negative for malignancy.  I had previously met with him about this finding and discussed options for consideration including focal ablative treatments as well as whole gland therapy.  We also reviewed focal therapy options both within and outside of clinical trial setting.  He was quite interested in the approach of using the steam ablation as part of the Vapor 2 clinical trial.  We reviewed that and I also provided him with the patient consent document for his review.  He has now had adequate time to read through it and he remains interested in moving forward.  He is here today to have a follow-up discussion and confirm his enrollment.  The patient does not have major bothersome lower urinary tract complaints.  He is not currently on any prostate related medications such as alpha-blocker or 5 alpha reductase inhibitor.  His pathway to prostate cancer diagnosis was a bit unusual and that he initially had undergone an FDG PET scan last year for evaluation of a lung lesion.  There was an abnormality detected in the prostate on that PET scan prompting additional workup with MRI.  This was done despite a relatively low PSA level of 1.61 ng/mL.  The MRI had shown the PI-RADS category 4 lesion as noted above and subsequent biopsy was performed this year.

## 2024-06-30 ENCOUNTER — TRANSCRIPTION ENCOUNTER (OUTPATIENT)
Age: 79
End: 2024-06-30

## 2024-06-30 PROBLEM — C61 PROSTATE CANCER: Status: ACTIVE | Noted: 2024-04-12

## 2024-06-30 PROBLEM — G62.9 PERIPHERAL NEUROPATHY: Status: ACTIVE | Noted: 2023-12-18

## 2024-07-01 ENCOUNTER — TRANSCRIPTION ENCOUNTER (OUTPATIENT)
Age: 79
End: 2024-07-01

## 2024-07-01 ENCOUNTER — APPOINTMENT (OUTPATIENT)
Dept: UROLOGY | Facility: AMBULATORY SURGERY CENTER | Age: 79
End: 2024-07-01

## 2024-07-01 ENCOUNTER — OUTPATIENT (OUTPATIENT)
Dept: OUTPATIENT SERVICES | Facility: HOSPITAL | Age: 79
LOS: 1 days | Discharge: HOME | End: 2024-07-01

## 2024-07-01 VITALS
DIASTOLIC BLOOD PRESSURE: 84 MMHG | RESPIRATION RATE: 18 BRPM | TEMPERATURE: 97 F | SYSTOLIC BLOOD PRESSURE: 158 MMHG | WEIGHT: 223.99 LBS | HEART RATE: 65 BPM | HEIGHT: 66 IN | OXYGEN SATURATION: 98 %

## 2024-07-01 VITALS
OXYGEN SATURATION: 95 % | HEART RATE: 51 BPM | RESPIRATION RATE: 18 BRPM | SYSTOLIC BLOOD PRESSURE: 147 MMHG | DIASTOLIC BLOOD PRESSURE: 77 MMHG | TEMPERATURE: 97 F

## 2024-07-01 DIAGNOSIS — Z98.1 ARTHRODESIS STATUS: Chronic | ICD-10-CM

## 2024-07-01 DIAGNOSIS — Z98.890 OTHER SPECIFIED POSTPROCEDURAL STATES: Chronic | ICD-10-CM

## 2024-07-01 DIAGNOSIS — Z96.652 PRESENCE OF LEFT ARTIFICIAL KNEE JOINT: Chronic | ICD-10-CM

## 2024-07-01 DIAGNOSIS — C61 MALIGNANT NEOPLASM OF PROSTATE: ICD-10-CM

## 2024-07-01 PROCEDURE — ZZZZZ: CPT

## 2024-07-01 RX ORDER — DEXTROSE MONOHYDRATE AND SODIUM CHLORIDE 5; .3 G/100ML; G/100ML
1000 INJECTION, SOLUTION INTRAVENOUS
Refills: 0 | Status: DISCONTINUED | OUTPATIENT
Start: 2024-07-01 | End: 2024-07-15

## 2024-07-01 RX ORDER — ALLOPURINOL 300 MG/1
1 TABLET ORAL
Refills: 0 | DISCHARGE

## 2024-07-01 RX ORDER — ASPIRIN 325 MG/1
1 TABLET, FILM COATED ORAL
Refills: 0 | DISCHARGE

## 2024-07-01 RX ORDER — ZOLPIDEM TARTRATE 10 MG
1 TABLET ORAL
Refills: 0 | DISCHARGE

## 2024-07-01 RX ORDER — ALBUTEROL 90 MCG
2 AEROSOL REFILL (GRAM) INHALATION
Refills: 0 | DISCHARGE

## 2024-07-01 RX ORDER — FLUTICASONE FUROATE, UMECLIDINIUM BROMIDE AND VILANTEROL TRIFENATATE 200; 62.5; 25 UG/1; UG/1; UG/1
1 POWDER RESPIRATORY (INHALATION)
Refills: 0 | DISCHARGE

## 2024-07-01 RX ORDER — PREGABALIN 50 MG/1
1 CAPSULE ORAL
Refills: 0 | DISCHARGE

## 2024-07-01 RX ORDER — CEPHALEXIN 500 MG/1
500 CAPSULE ORAL
Qty: 9 | Refills: 0 | Status: ACTIVE | COMMUNITY
Start: 2024-07-01 | End: 1900-01-01

## 2024-07-01 RX ORDER — ALBUTEROL 90 MCG
3 AEROSOL REFILL (GRAM) INHALATION
Refills: 0 | DISCHARGE

## 2024-07-08 ENCOUNTER — APPOINTMENT (OUTPATIENT)
Dept: UROLOGY | Facility: CLINIC | Age: 79
End: 2024-07-08
Payer: MEDICARE

## 2024-07-08 ENCOUNTER — OUTPATIENT (OUTPATIENT)
Dept: OUTPATIENT SERVICES | Facility: HOSPITAL | Age: 79
LOS: 1 days | End: 2024-07-08

## 2024-07-08 VITALS — DIASTOLIC BLOOD PRESSURE: 74 MMHG | HEART RATE: 72 BPM | SYSTOLIC BLOOD PRESSURE: 132 MMHG

## 2024-07-08 DIAGNOSIS — Z96.652 PRESENCE OF LEFT ARTIFICIAL KNEE JOINT: Chronic | ICD-10-CM

## 2024-07-08 DIAGNOSIS — Z98.1 ARTHRODESIS STATUS: Chronic | ICD-10-CM

## 2024-07-08 DIAGNOSIS — Z98.890 OTHER SPECIFIED POSTPROCEDURAL STATES: Chronic | ICD-10-CM

## 2024-07-08 DIAGNOSIS — R35.0 FREQUENCY OF MICTURITION: ICD-10-CM

## 2024-07-08 PROCEDURE — 99024 POSTOP FOLLOW-UP VISIT: CPT

## 2024-07-09 ENCOUNTER — APPOINTMENT (OUTPATIENT)
Dept: MRI IMAGING | Facility: IMAGING CENTER | Age: 79
End: 2024-07-09
Payer: SUBSIDIZED

## 2024-07-09 ENCOUNTER — RESULT REVIEW (OUTPATIENT)
Age: 79
End: 2024-07-09

## 2024-07-09 ENCOUNTER — OUTPATIENT (OUTPATIENT)
Dept: OUTPATIENT SERVICES | Facility: HOSPITAL | Age: 79
LOS: 1 days | End: 2024-07-09
Payer: SUBSIDIZED

## 2024-07-09 DIAGNOSIS — Z98.890 OTHER SPECIFIED POSTPROCEDURAL STATES: Chronic | ICD-10-CM

## 2024-07-09 DIAGNOSIS — C61 MALIGNANT NEOPLASM OF PROSTATE: ICD-10-CM

## 2024-07-09 DIAGNOSIS — Z98.1 ARTHRODESIS STATUS: Chronic | ICD-10-CM

## 2024-07-09 PROCEDURE — 72197 MRI PELVIS W/O & W/DYE: CPT

## 2024-07-09 PROCEDURE — 76498P: CUSTOM | Mod: 26

## 2024-07-09 PROCEDURE — A9585: CPT

## 2024-07-09 PROCEDURE — 76498 UNLISTED MR PROCEDURE: CPT

## 2024-07-09 PROCEDURE — 72197 MRI PELVIS W/O & W/DYE: CPT | Mod: 26

## 2024-07-24 ENCOUNTER — NON-APPOINTMENT (OUTPATIENT)
Age: 79
End: 2024-07-24

## 2024-08-09 ENCOUNTER — RX RENEWAL (OUTPATIENT)
Age: 79
End: 2024-08-09

## 2024-08-14 ENCOUNTER — RX RENEWAL (OUTPATIENT)
Age: 79
End: 2024-08-14

## 2024-08-21 ENCOUNTER — APPOINTMENT (OUTPATIENT)
Dept: PULMONOLOGY | Facility: CLINIC | Age: 79
End: 2024-08-21
Payer: MEDICARE

## 2024-08-21 ENCOUNTER — APPOINTMENT (OUTPATIENT)
Dept: RADIOLOGY | Facility: HOSPITAL | Age: 79
End: 2024-08-21
Payer: MEDICARE

## 2024-08-21 VITALS
WEIGHT: 223 LBS | DIASTOLIC BLOOD PRESSURE: 80 MMHG | SYSTOLIC BLOOD PRESSURE: 130 MMHG | HEART RATE: 83 BPM | BODY MASS INDEX: 31.92 KG/M2 | TEMPERATURE: 97.6 F | HEIGHT: 70 IN | OXYGEN SATURATION: 98 %

## 2024-08-21 DIAGNOSIS — R06.02 SHORTNESS OF BREATH: ICD-10-CM

## 2024-08-21 DIAGNOSIS — R93.89 ABNORMAL FINDINGS ON DIAGNOSTIC IMAGING OF OTHER SPECIFIED BODY STRUCTURES: ICD-10-CM

## 2024-08-21 DIAGNOSIS — J44.1 CHRONIC OBSTRUCTIVE PULMONARY DISEASE WITH (ACUTE) EXACERBATION: ICD-10-CM

## 2024-08-21 PROCEDURE — 99214 OFFICE O/P EST MOD 30 MIN: CPT

## 2024-08-21 PROCEDURE — G2211 COMPLEX E/M VISIT ADD ON: CPT

## 2024-08-21 PROCEDURE — 71046 X-RAY EXAM CHEST 2 VIEWS: CPT | Mod: 26

## 2024-08-21 RX ORDER — PREDNISONE 20 MG/1
20 TABLET ORAL DAILY
Qty: 21 | Refills: 1 | Status: ACTIVE | COMMUNITY
Start: 2024-08-21 | End: 1900-01-01

## 2024-08-21 RX ORDER — AZITHROMYCIN 250 MG/1
250 TABLET, FILM COATED ORAL
Qty: 1 | Refills: 0 | Status: ACTIVE | COMMUNITY
Start: 2024-08-21 | End: 1900-01-01

## 2024-08-21 NOTE — PHYSICAL EXAM
[IV] : Mallampati Class: IV [Normal Appearance] : normal appearance [Supple] : supple [No Neck Mass] : no neck mass [No JVD] : no jvd [Normal Rate/Rhythm] : normal rate/rhythm [Normal S1, S2] : normal s1, s2 [No Murmurs] : no murmurs [No Resp Distress] : no resp distress [No Acc Muscle Use] : no acc muscle use [Benign] : benign [Not Tender] : not tender [No Masses] : no masses [Soft] : soft [Normal Gait] : normal gait [No Clubbing] : no clubbing [No Edema] : no edema [No Rash] : no rash [No Motor Deficits] : no motor deficits [Normal Affect] : normal affect [TextBox_2] : Pleasant      male no distress no cough  [TextBox_11] : crowded no lesion moist  [TextBox_68] : bilateral air entry   wheezing noted

## 2024-08-21 NOTE — REVIEW OF SYSTEMS
[Wheezing] : wheezing [Obesity] : obesity [Cough] : cough [Fever] : no fever [Recent Wt Gain (___ Lbs)] : ~T no recent weight gain [Chills] : no chills [Hemoptysis] : no hemoptysis [Chest Tightness] : no chest tightness [Sputum] : no sputum [Dyspnea] : no dyspnea [SOB on Exertion] : no sob on exertion [Chest Discomfort] : no chest discomfort [Palpitations] : no palpitations [Seasonal Allergies] : no seasonal allergies [GERD] : no gerd [Abdominal Pain] : no abdominal pain [Nausea] : no nausea [Vomiting] : no vomiting [Dysphagia] : no dysphagia [Bleeding] : no bleeding [Chronic Pain] : no chronic pain [Rash] : no rash [Blood Transfusion] : no blood transfusion [Diabetes] : no diabetes [Clotting Disorder/ Frequent bleeding] : no clotting disorder/ frequent bleeding [Thyroid Problem] : no thyroid problem

## 2024-08-21 NOTE — ASSESSMENT
[FreeTextEntry1] : 78y/o  male  1- ct  6/2024 stable  lower lobe peripheral reticular opacities suggestive of scarring  mucoid impaction ground glass  2- COPD/asthma  with current  exacerbation    with  recent  Moderna  vaccination  3- prostate  cancer hx  no chenmo 4- vaccinations  covid   flu        Recommendations 1-  d-dimer 2- trelegy   200 qd  tolerated  3- albuterol MDI  two inh   q  6hour prn   4-  prednisone taper 5- cxr 6- RVP  7- z deion    -discussed ct reviewed imaging  - discussed PFT - discussed medications and current exacerbation

## 2024-08-21 NOTE — HISTORY OF PRESENT ILLNESS
[Former] : former [>= 20 pack years] : >= 20 pack years [Never] : never [TextBox_4] : moved   back from [place]   had     arielle OSHEA  asthma  5/28/2024 80y/o    [place]  [place]  ex smoker  (  35 pack years   16- up to 22pd  quit    1991 about )   [employer]  - s stationed     [employer] -   h/o prostate  cancer    -      will get   steam   treatment -  LBBB      ct   1/24   ground glass nodule   COPD/asthma -  mold mildew  allergy  -  yearly  ct   were done - on  trelegy and  albuterol  -flonase - uses cane due to   right ankle surgery    can ambulate  few  blocks no ches tpian no sob   8/21/2024 80y/o male born [place] ex smoker   quit  1991   h/o  prostate cancer    here for COPD    sick   - on trelegy    -  f/u ct   stable findings reviewed  -had  covid vaccine   1.5 weeks ago now    with few days increase in sob  -some  cough with phlegm stuck  clear - no  chest pain  some  congestion  - no fever no hemoptysis  -   -  -  [TextBox_11] : 1 [YearQuit] : 1991

## 2024-08-22 LAB
DEPRECATED D DIMER PPP IA-ACNC: 274 NG/ML DDU
RAPID RVP RESULT: NOT DETECTED
SARS-COV-2 RNA PNL RESP NAA+PROBE: NOT DETECTED

## 2024-09-04 ENCOUNTER — RX RENEWAL (OUTPATIENT)
Age: 79
End: 2024-09-04

## 2024-09-18 ENCOUNTER — APPOINTMENT (OUTPATIENT)
Dept: OTOLARYNGOLOGY | Facility: CLINIC | Age: 79
End: 2024-09-18

## 2024-09-18 VITALS
OXYGEN SATURATION: 99 % | HEIGHT: 70 IN | HEART RATE: 67 BPM | TEMPERATURE: 97.2 F | WEIGHT: 223 LBS | BODY MASS INDEX: 31.92 KG/M2

## 2024-09-18 DIAGNOSIS — H61.20 IMPACTED CERUMEN, UNSPECIFIED EAR: ICD-10-CM

## 2024-09-18 DIAGNOSIS — Z86.69 PERSONAL HISTORY OF OTHER DISEASES OF THE NERVOUS SYSTEM AND SENSE ORGANS: ICD-10-CM

## 2024-09-18 DIAGNOSIS — Z86.39 PERSONAL HISTORY OF OTHER ENDOCRINE, NUTRITIONAL AND METABOLIC DISEASE: ICD-10-CM

## 2024-09-18 PROCEDURE — 99213 OFFICE O/P EST LOW 20 MIN: CPT | Mod: 25

## 2024-09-18 PROCEDURE — 69210 REMOVE IMPACTED EAR WAX UNI: CPT | Mod: LT

## 2024-09-18 NOTE — PROCEDURE
[Risk and Benefits Discussed] : The purpose, risks, discomforts, benefits and alternatives of the procedure have been explained to the patient including no treatment. [Cerumen Impaction] : Cerumen Impaction [Same] : same as the Pre Op Dx. [] : Removal of Cerumen [FreeTextEntry2] : removed  [FreeTextEntry6] : After informed verbal consent is obtained, cerumen is removed from the left ear canal after being applied peroxide to loosen it. The cerumen was removed using a microscope and suctioning. The patient tolerated the procedure well.

## 2024-09-18 NOTE — CONSULT LETTER
[Dear  ___] : Dear  [unfilled], [Courtesy Letter:] : I had the pleasure of seeing your patient, [unfilled], in my office today. [Please see my note below.] : Please see my note below. [Sincerely,] : Sincerely, [FreeTextEntry2] : Oswaldo Antoine MD ([facility]) [FreeTextEntry3] : Iman Bryant, АНДРЕЙ, PABradC Sr. Physician Assistant, Otolaryngology at [facility] Adjunct Clinical Instructor, Department of Physician Assistant Studies, [address] [address]

## 2024-09-18 NOTE — PHYSICAL EXAM
[de-identified] : +cerumen impaction in left ear canal - removed. [Midline] : trachea located in midline position [Normal] : no rashes

## 2024-09-18 NOTE — REASON FOR VISIT
[Subsequent Evaluation] : a subsequent evaluation for [Hearing Loss] : hearing loss [FreeTextEntry2] : Cerumen impaction and hearing loss

## 2024-09-18 NOTE — HISTORY OF PRESENT ILLNESS
[de-identified] : Mr. CASTRO is a 79 year-old male who presents with cerumen impaction and ear cleaning (3mo cleaning follow-up). Wears b/l hearing aids for hearing loss.  [Ear Fullness] : ear fullness [Hearing Loss] : hearing loss

## 2024-09-24 ENCOUNTER — APPOINTMENT (OUTPATIENT)
Dept: PULMONOLOGY | Facility: CLINIC | Age: 79
End: 2024-09-24
Payer: MEDICARE

## 2024-09-24 VITALS
BODY MASS INDEX: 31.5 KG/M2 | WEIGHT: 220 LBS | OXYGEN SATURATION: 97 % | TEMPERATURE: 97.6 F | HEIGHT: 70 IN | HEART RATE: 68 BPM | DIASTOLIC BLOOD PRESSURE: 78 MMHG | RESPIRATION RATE: 16 BRPM | SYSTOLIC BLOOD PRESSURE: 128 MMHG

## 2024-09-24 DIAGNOSIS — J45.30 MILD PERSISTENT ASTHMA, UNCOMPLICATED: ICD-10-CM

## 2024-09-24 DIAGNOSIS — J44.89 OTHER SPECIFIED CHRONIC OBSTRUCTIVE PULMONARY DISEASE: ICD-10-CM

## 2024-09-24 PROCEDURE — G2211 COMPLEX E/M VISIT ADD ON: CPT

## 2024-09-24 PROCEDURE — 99213 OFFICE O/P EST LOW 20 MIN: CPT

## 2024-09-24 NOTE — REVIEW OF SYSTEMS
[Cough] : cough [Wheezing] : wheezing [Obesity] : obesity [Fever] : no fever [Recent Wt Gain (___ Lbs)] : ~T recent [unfilled] lb weight gain [Chills] : no chills [Recent Wt Loss (___ Lbs)] : ~T no recent weight loss [Hemoptysis] : no hemoptysis [Chest Tightness] : no chest tightness [Sputum] : no sputum [Dyspnea] : no dyspnea [SOB on Exertion] : no sob on exertion [Chest Discomfort] : no chest discomfort [Palpitations] : no palpitations [Seasonal Allergies] : no seasonal allergies [GERD] : gerd [Abdominal Pain] : no abdominal pain [Nausea] : no nausea [Vomiting] : no vomiting [Dysphagia] : no dysphagia [Bleeding] : no bleeding [Chronic Pain] : no chronic pain [Rash] : no rash [Blood Transfusion] : no blood transfusion [Clotting Disorder/ Frequent bleeding] : no clotting disorder/ frequent bleeding [Diabetes] : no diabetes [Thyroid Problem] : no thyroid problem

## 2024-09-24 NOTE — ASSESSMENT
[FreeTextEntry1] : 80y/o  male  1- ct  6/2024 stable  lower lobe peripheral reticular opacities suggestive of scarring  mucoid impaction ground glass  2- COPD/asthma     now stable 3- prostate  cancer hx  no chemo 4- vaccinations  covid   flu      Moderna   Recommendations 1- improved exacerbation  2- trelegy   200 qd  tolerated  3- albuterol MDI  two inh   q  6hour prn   4-  weight gain - discussed  sleep issues   refuses VONDA  work up  weight loss discussed  5- f/u   6-12 months   ct      -  discussion and  reviewed above improved

## 2024-09-24 NOTE — PHYSICAL EXAM
[No Acute Distress] : no acute distress [No Deformities] : no deformities [IV] : Mallampati Class: IV [Normal Appearance] : normal appearance [Supple] : supple [No Neck Mass] : no neck mass [No JVD] : no jvd [Normal Rate/Rhythm] : normal rate/rhythm [Normal S1, S2] : normal s1, s2 [No Murmurs] : no murmurs [No Resp Distress] : no resp distress [No Acc Muscle Use] : no acc muscle use [Clear to Auscultation Bilaterally] : clear to auscultation bilaterally [Kyphosis] : kyphosis [Benign] : benign [Not Tender] : not tender [Normal Gait] : normal gait [No Clubbing] : no clubbing [No Rash] : no rash [No Motor Deficits] : no motor deficits [Normal Affect] : normal affect [TextBox_2] : pleasant male nodistress no cough [TextBox_11] : crowded no lesion [TextBox_105] : edema

## 2024-09-24 NOTE — HISTORY OF PRESENT ILLNESS
[Former] : former [>= 20 pack years] : >= 20 pack years [Never] : never [TextBox_4] : moved   back from [place]   had     arielle OSHEA  asthma  5/28/2024 78y/o    [place]  [place]  [place]  (  35 pack years   16- up to 22pd  quit    1991 about )   [employer]  - s stationed     [employer] -   h/o prostate  cancer    -      will get   steam   treatment -  LBBB      ct   1/24   ground glass nodule   COPD/asthma -  mold mildew  allergy  -  yearly  ct   were done - on  trelegy and  albuterol  -flonase - uses cane due to   right ankle surgery    can ambulate  few  blocks no ches tpian no sob   8/21/2024 78y/o [place] ex [place]oker   quit  1991   h/o  prostate cancer    here for COPD    sick   - on trelegy    -  f/u ct   stable findings reviewed  -had  covid vaccine   1.5 weeks ago now    with few days increase in sob  -some  cough with phlegm stuck  clear - no  chest pain  some  congestion  - no fever no hemoptysis  -   - 9/24/2024 78y/o male ex smoker  h/o   prostate cancer   COPD -some sob  intermittent     changed  detergent  felt better -using  neb  - on trelegy doing well  -  [TextBox_11] : 1 [YearQuit] : 1991

## 2024-09-24 NOTE — PROCEDURE
[FreeTextEntry1] : 78y/o male ex smoker BMI 30.85  PFT 6/13/2024 Fair efforts FVC is reduced FEV1 is reduced FEV1/FVC is normal There is no significant bronchodilator response OQX56-09% is normal  MVV is reduced lung volumes by body plethysmography reveal normal TLC and RV/TLC DLCO is normal and DLCO/Va is normal  Impression Likely normal spirometry TLC and DLCO Reduced MVV maybe due to poor efforts or neuromuscular weakness Although not significant there is a 19 percent post bronchodilator increase in FEF 75%  recommend clinical correlation

## 2024-10-02 ENCOUNTER — RX RENEWAL (OUTPATIENT)
Age: 79
End: 2024-10-02

## 2024-10-03 ENCOUNTER — APPOINTMENT (OUTPATIENT)
Dept: UROLOGY | Facility: CLINIC | Age: 79
End: 2024-10-03
Payer: MEDICARE

## 2024-10-03 VITALS — DIASTOLIC BLOOD PRESSURE: 88 MMHG | SYSTOLIC BLOOD PRESSURE: 172 MMHG | OXYGEN SATURATION: 96 % | HEART RATE: 71 BPM

## 2024-10-03 DIAGNOSIS — C61 MALIGNANT NEOPLASM OF PROSTATE: ICD-10-CM

## 2024-10-03 DIAGNOSIS — N52.31 ERECTILE DYSFUNCTION FOLLOWING RADICAL PROSTATECTOMY: ICD-10-CM

## 2024-10-03 DIAGNOSIS — R33.9 RETENTION OF URINE, UNSPECIFIED: ICD-10-CM

## 2024-10-03 DIAGNOSIS — N52.9 MALE ERECTILE DYSFUNCTION, UNSPECIFIED: ICD-10-CM

## 2024-10-03 PROCEDURE — G2211 COMPLEX E/M VISIT ADD ON: CPT

## 2024-10-03 PROCEDURE — 99214 OFFICE O/P EST MOD 30 MIN: CPT

## 2024-10-06 PROBLEM — N52.31 ERECTILE DYSFUNCTION AFTER RADICAL PROSTATECTOMY: Status: ACTIVE | Noted: 2024-10-06

## 2024-10-06 PROBLEM — R33.9 INCOMPLETE BLADDER EMPTYING: Status: RESOLVED | Noted: 2024-02-05 | Resolved: 2024-10-06

## 2024-10-06 PROBLEM — N52.9 ORGANIC ERECTILE DYSFUNCTION: Status: ACTIVE | Noted: 2024-10-06

## 2024-10-06 NOTE — HISTORY OF PRESENT ILLNESS
[FreeTextEntry1] : MARKEL CASTRO returns to the office today. He is a 79 year-old man who underwent water vapor focal ablation of the prostate in July as part of our VAPOR 2 clinical trial for [place] 3+4 disease that was localized to the MRI target lesion.    Today he is very happy with his urinary symptoms since his procedure. He no longer has suprapubic pressure, nocturia has decreased to 1-2xnight. He reports decreased daytime frequency and stronger stream. Denies hematuria, dysuria, fevers, or chills.  He also reports longtime erectile dysunction. He has never sought treatment for his symptoms, but today he is interested in trying medications. He is not currently sexually active.

## 2024-10-06 NOTE — LETTER GREETING
[Dear  ___] : Dear  [unfilled], [Follow-Up] : Your patient, [unfilled] was seen in my office today for follow-up [Please see my note below.] : Please see my note below. [FreeTextEntry2] : Oswaldo Antoine  [place] [address]

## 2024-10-06 NOTE — LETTER CLOSING
[FreeTextEntry3] : Sincerely,      Oswaldo Jones MD, FACS Director of Urology Services, [facility] Chief of Urology, [facility]  of Urology   [facility], [place] [id number] P: [phone] F: [phone] [facility]

## 2024-10-06 NOTE — LETTER GREETING
[Dear  ___] : Dear  [unfilled], [Follow-Up] : Your patient, [unfilled] was seen in my office today for follow-up [Please see my note below.] : Please see my note below. [FreeTextEntry2] : Oswaldo Antoine  [place] [address]

## 2024-10-06 NOTE — PHYSICAL EXAM
[No Prostate Nodules] : no prostate nodules [Normal] : normal external genitalia without lesions and no testicular masses [Normal External Genitalia] : normal external genitalia without lesions [Normal] : oriented to person, place and time, the affect was normal, the mood was normal and not anxious [FreeTextEntry1] : Prostate is smooth and symmetric, no enlargement noted of significance.  No nodularity or focal induration present.

## 2024-10-06 NOTE — LETTER GREETING
[Dear  ___] : Dear  [unfilled], [Follow-Up] : Your patient, [unfilled] was seen in my office today for follow-up [Please see my note below.] : Please see my note below. [FreeTextEntry2] : Oswaldo [address] [address]

## 2024-10-06 NOTE — LETTER CLOSING
[FreeTextEntry3] : Sincerely,      Oswaldo Jones MD, FACS Director of Urology Services, [facility] Chief of Urology, [facility]  of Urology   [facility], [place] [id number] P: [phone] F: [phone] [place]

## 2024-10-06 NOTE — DISEASE MANAGEMENT
[1] : T1 [c] : c [X] : MX [0-10] : 0 -10 ng/mL [Biopsy with Fusion] : Patient had a biopsy with fusion on [7(3+4)] : Fusion Biopsy [place] Score: 7(3+4) [Biopsy results sent to PCP/Referring Physician] : Biopsy results sent to PCP/Referring Physician [] : Patient had a Prostate MRI [4] : 4 [IIB] : IIB [BiopsyDate] : 03/24 [MeasuredProstateVolume] : 27 [TotalCores] : 13 [TotalPositiveCores] : 1 [MaxCoreInvolvement] : 90

## 2024-10-06 NOTE — DISEASE MANAGEMENT
[1] : T1 [c] : c [X] : MX [0-10] : 0 -10 ng/mL [Biopsy with Fusion] : Patient had a biopsy with fusion on [7(3+4)] : Fusion Biopsy [place] Score: 7(3+4) [Biopsy results sent to PCP/Referring Physician] : Biopsy results sent to PCP/Referring Physician [] : Patient had a Prostate MRI [4] : 4 [IIB] : IIB [BiopsyDate] : 03/24 [TotalCores] : 13 [MeasuredProstateVolume] : 27 [TotalPositiveCores] : 1 [MaxCoreInvolvement] : 90

## 2024-10-06 NOTE — DISEASE MANAGEMENT
[1] : T1 [c] : c [X] : MX [0-10] : 0 -10 ng/mL [Biopsy with Fusion] : Patient had a biopsy with fusion on [7(3+4)] : Fusion Biopsy [place] Score: 7(3+4) [Biopsy results sent to PCP/Referring Physician] : Biopsy results sent to PCP/Referring Physician [] : Patient had a Prostate MRI [4] : 4 [IIB] : IIB [BiopsyDate] : 03/24 [TotalCores] : 13 [MeasuredProstateVolume] : 27 [TotalPositiveCores] : 1 [MaxCoreInvolvement] : 90

## 2024-10-06 NOTE — LETTER CLOSING
[FreeTextEntry3] : Sincerely,      Oswaldo Jones MD, FACS Director of Urology Services, [facility] Chief of Urology, [facility]  of Urology   [facility], [place] [id number] P: [phone] F: [phone] [facility]

## 2024-10-30 ENCOUNTER — RX RENEWAL (OUTPATIENT)
Age: 79
End: 2024-10-30

## 2024-10-30 ENCOUNTER — APPOINTMENT (OUTPATIENT)
Dept: PULMONOLOGY | Facility: CLINIC | Age: 79
End: 2024-10-30

## 2024-11-06 ENCOUNTER — APPOINTMENT (OUTPATIENT)
Dept: CT IMAGING | Facility: HOSPITAL | Age: 79
End: 2024-11-06

## 2024-11-06 ENCOUNTER — OUTPATIENT (OUTPATIENT)
Dept: OUTPATIENT SERVICES | Facility: HOSPITAL | Age: 79
LOS: 1 days | End: 2024-11-06
Payer: MEDICARE

## 2024-11-06 DIAGNOSIS — Z98.890 OTHER SPECIFIED POSTPROCEDURAL STATES: Chronic | ICD-10-CM

## 2024-11-06 DIAGNOSIS — R93.89 ABNORMAL FINDINGS ON DIAGNOSTIC IMAGING OF OTHER SPECIFIED BODY STRUCTURES: ICD-10-CM

## 2024-11-06 DIAGNOSIS — Z96.652 PRESENCE OF LEFT ARTIFICIAL KNEE JOINT: Chronic | ICD-10-CM

## 2024-11-06 DIAGNOSIS — Z98.1 ARTHRODESIS STATUS: Chronic | ICD-10-CM

## 2024-11-06 PROCEDURE — 71250 CT THORAX DX C-: CPT | Mod: 26

## 2024-11-06 PROCEDURE — 71250 CT THORAX DX C-: CPT

## 2024-11-13 ENCOUNTER — APPOINTMENT (OUTPATIENT)
Dept: NEUROLOGY | Facility: CLINIC | Age: 79
End: 2024-11-13
Payer: MEDICARE

## 2024-11-13 VITALS
OXYGEN SATURATION: 96 % | TEMPERATURE: 98.1 F | SYSTOLIC BLOOD PRESSURE: 142 MMHG | DIASTOLIC BLOOD PRESSURE: 74 MMHG | HEART RATE: 88 BPM | WEIGHT: 220 LBS | BODY MASS INDEX: 31.5 KG/M2 | HEIGHT: 70 IN

## 2024-11-13 DIAGNOSIS — G62.9 POLYNEUROPATHY, UNSPECIFIED: ICD-10-CM

## 2024-11-13 PROCEDURE — 99215 OFFICE O/P EST HI 40 MIN: CPT

## 2024-11-13 PROCEDURE — G2211 COMPLEX E/M VISIT ADD ON: CPT

## 2024-11-18 ENCOUNTER — NON-APPOINTMENT (OUTPATIENT)
Age: 79
End: 2024-11-18

## 2024-11-19 ENCOUNTER — APPOINTMENT (OUTPATIENT)
Dept: PULMONOLOGY | Facility: CLINIC | Age: 79
End: 2024-11-19
Payer: MEDICARE

## 2024-11-19 VITALS
SYSTOLIC BLOOD PRESSURE: 130 MMHG | OXYGEN SATURATION: 96 % | HEART RATE: 82 BPM | DIASTOLIC BLOOD PRESSURE: 80 MMHG | TEMPERATURE: 96.8 F | HEIGHT: 70 IN | BODY MASS INDEX: 31.5 KG/M2 | WEIGHT: 220 LBS

## 2024-11-19 DIAGNOSIS — R93.89 ABNORMAL FINDINGS ON DIAGNOSTIC IMAGING OF OTHER SPECIFIED BODY STRUCTURES: ICD-10-CM

## 2024-11-19 DIAGNOSIS — J44.1 CHRONIC OBSTRUCTIVE PULMONARY DISEASE WITH (ACUTE) EXACERBATION: ICD-10-CM

## 2024-11-19 PROCEDURE — 99213 OFFICE O/P EST LOW 20 MIN: CPT

## 2024-11-19 PROCEDURE — G2211 COMPLEX E/M VISIT ADD ON: CPT

## 2024-11-19 RX ORDER — AZITHROMYCIN 250 MG/1
250 TABLET, FILM COATED ORAL
Qty: 1 | Refills: 0 | Status: ACTIVE | COMMUNITY
Start: 2024-11-19 | End: 1900-01-01

## 2024-11-19 RX ORDER — AZITHROMYCIN 250 MG/1
250 TABLET, FILM COATED ORAL
Qty: 36 | Refills: 1 | Status: ACTIVE | COMMUNITY
Start: 2024-11-19 | End: 1900-01-01

## 2024-11-19 RX ORDER — PREDNISONE 20 MG/1
20 TABLET ORAL DAILY
Qty: 21 | Refills: 0 | Status: ACTIVE | COMMUNITY
Start: 2024-11-19 | End: 1900-01-01

## 2024-11-21 LAB
ALBUPE: 12.5 %
ALPHA1UPE: 31.9 %
ALPHA2UPE: 26.3 %
BENCE JONES EXCRETION: 0 MG/24HR
BETAUPE: 19.7 %
CREAT 24H UR-MCNC: 1.7 G/24 H
CREATININE UR (MAYO): 98 MG/DL
GAMMAUPE: 9.6 %
IGA 24H UR QL IFE: NORMAL
KAPPA LC 24H UR QL: 0 MG/DL
M PROTEIN 24H MFR UR ELPH: 0 %
PROT ?TM UR-MCNC: 24 HR
PROT PATTERN 24H UR ELPH-IMP: NORMAL
PROT UR-MCNC: 10 MG/DL
PROT UR-MCNC: 10 MG/DL
SPECIMEN VOL 24H UR: 1775 ML
U PROTEIN QNT CALCULATION: 178 MG/24 H

## 2024-11-23 ENCOUNTER — INPATIENT (INPATIENT)
Facility: HOSPITAL | Age: 79
LOS: 3 days | Discharge: HOME | DRG: 203 | End: 2024-11-27
Attending: STUDENT IN AN ORGANIZED HEALTH CARE EDUCATION/TRAINING PROGRAM | Admitting: INTERNAL MEDICINE
Payer: MEDICARE

## 2024-11-23 VITALS
HEART RATE: 82 BPM | OXYGEN SATURATION: 95 % | HEIGHT: 70 IN | TEMPERATURE: 99 F | SYSTOLIC BLOOD PRESSURE: 139 MMHG | DIASTOLIC BLOOD PRESSURE: 71 MMHG | WEIGHT: 220.02 LBS | RESPIRATION RATE: 18 BRPM

## 2024-11-23 DIAGNOSIS — Z98.890 OTHER SPECIFIED POSTPROCEDURAL STATES: Chronic | ICD-10-CM

## 2024-11-23 DIAGNOSIS — Z98.1 ARTHRODESIS STATUS: Chronic | ICD-10-CM

## 2024-11-23 DIAGNOSIS — J45.901 UNSPECIFIED ASTHMA WITH (ACUTE) EXACERBATION: ICD-10-CM

## 2024-11-23 DIAGNOSIS — Z96.652 PRESENCE OF LEFT ARTIFICIAL KNEE JOINT: Chronic | ICD-10-CM

## 2024-11-23 LAB
ALBUMIN SERPL ELPH-MCNC: 3.3 G/DL — SIGNIFICANT CHANGE UP (ref 3.3–5)
ALP SERPL-CCNC: 68 U/L — SIGNIFICANT CHANGE UP (ref 40–120)
ALT FLD-CCNC: 24 U/L — SIGNIFICANT CHANGE UP (ref 10–45)
ANION GAP SERPL CALC-SCNC: 7 MMOL/L — SIGNIFICANT CHANGE UP (ref 5–17)
AST SERPL-CCNC: 27 U/L — SIGNIFICANT CHANGE UP (ref 10–40)
BASOPHILS # BLD AUTO: 0.01 K/UL — SIGNIFICANT CHANGE UP (ref 0–0.2)
BASOPHILS NFR BLD AUTO: 0.1 % — SIGNIFICANT CHANGE UP (ref 0–2)
BILIRUB SERPL-MCNC: 0.4 MG/DL — SIGNIFICANT CHANGE UP (ref 0.2–1.2)
BUN SERPL-MCNC: 26 MG/DL — HIGH (ref 7–23)
CALCIUM SERPL-MCNC: 8.8 MG/DL — SIGNIFICANT CHANGE UP (ref 8.4–10.5)
CHLORIDE SERPL-SCNC: 106 MMOL/L — SIGNIFICANT CHANGE UP (ref 96–108)
CO2 SERPL-SCNC: 29 MMOL/L — SIGNIFICANT CHANGE UP (ref 22–31)
CREAT SERPL-MCNC: 1 MG/DL — SIGNIFICANT CHANGE UP (ref 0.5–1.3)
D DIMER BLD IA.RAPID-MCNC: 213 NG/ML DDU — SIGNIFICANT CHANGE UP
EGFR: 78 ML/MIN/1.73M2 — SIGNIFICANT CHANGE UP
EOSINOPHIL # BLD AUTO: 0 K/UL — SIGNIFICANT CHANGE UP (ref 0–0.5)
EOSINOPHIL NFR BLD AUTO: 0 % — SIGNIFICANT CHANGE UP (ref 0–6)
FLUAV AG NPH QL: SIGNIFICANT CHANGE UP
FLUBV AG NPH QL: SIGNIFICANT CHANGE UP
GLUCOSE SERPL-MCNC: 126 MG/DL — HIGH (ref 70–99)
HCT VFR BLD CALC: 39.8 % — SIGNIFICANT CHANGE UP (ref 39–50)
HGB BLD-MCNC: 13.5 G/DL — SIGNIFICANT CHANGE UP (ref 13–17)
IMM GRANULOCYTES NFR BLD AUTO: 0.4 % — SIGNIFICANT CHANGE UP (ref 0–0.9)
LYMPHOCYTES # BLD AUTO: 0.64 K/UL — LOW (ref 1–3.3)
LYMPHOCYTES # BLD AUTO: 8.5 % — LOW (ref 13–44)
MCHC RBC-ENTMCNC: 30.2 PG — SIGNIFICANT CHANGE UP (ref 27–34)
MCHC RBC-ENTMCNC: 33.9 G/DL — SIGNIFICANT CHANGE UP (ref 32–36)
MCV RBC AUTO: 89 FL — SIGNIFICANT CHANGE UP (ref 80–100)
MONOCYTES # BLD AUTO: 0.59 K/UL — SIGNIFICANT CHANGE UP (ref 0–0.9)
MONOCYTES NFR BLD AUTO: 7.8 % — SIGNIFICANT CHANGE UP (ref 2–14)
NEUTROPHILS # BLD AUTO: 6.29 K/UL — SIGNIFICANT CHANGE UP (ref 1.8–7.4)
NEUTROPHILS NFR BLD AUTO: 83.2 % — HIGH (ref 43–77)
NRBC # BLD: 0 /100 WBCS — SIGNIFICANT CHANGE UP (ref 0–0)
NT-PROBNP SERPL-SCNC: 445 PG/ML — HIGH (ref 0–300)
PLATELET # BLD AUTO: 196 K/UL — SIGNIFICANT CHANGE UP (ref 150–400)
POTASSIUM SERPL-MCNC: 4.3 MMOL/L — SIGNIFICANT CHANGE UP (ref 3.5–5.3)
POTASSIUM SERPL-SCNC: 4.3 MMOL/L — SIGNIFICANT CHANGE UP (ref 3.5–5.3)
PROT SERPL-MCNC: 6.5 G/DL — SIGNIFICANT CHANGE UP (ref 6–8.3)
RBC # BLD: 4.47 M/UL — SIGNIFICANT CHANGE UP (ref 4.2–5.8)
RBC # FLD: 13.5 % — SIGNIFICANT CHANGE UP (ref 10.3–14.5)
RSV RNA NPH QL NAA+NON-PROBE: DETECTED
SARS-COV-2 RNA SPEC QL NAA+PROBE: SIGNIFICANT CHANGE UP
SARS-COV-2 RNA SPEC QL NAA+PROBE: SIGNIFICANT CHANGE UP
SODIUM SERPL-SCNC: 142 MMOL/L — SIGNIFICANT CHANGE UP (ref 135–145)
TROPONIN I, HIGH SENSITIVITY RESULT: 9.9 NG/L — SIGNIFICANT CHANGE UP
WBC # BLD: 7.56 K/UL — SIGNIFICANT CHANGE UP (ref 3.8–10.5)
WBC # FLD AUTO: 7.56 K/UL — SIGNIFICANT CHANGE UP (ref 3.8–10.5)

## 2024-11-23 PROCEDURE — 99233 SBSQ HOSP IP/OBS HIGH 50: CPT

## 2024-11-23 PROCEDURE — 99285 EMERGENCY DEPT VISIT HI MDM: CPT

## 2024-11-23 PROCEDURE — 93010 ELECTROCARDIOGRAM REPORT: CPT

## 2024-11-23 PROCEDURE — 71045 X-RAY EXAM CHEST 1 VIEW: CPT | Mod: 26

## 2024-11-23 RX ORDER — FLUTICASONE PROPIONATE AND SALMETEROL XINAFOATE 45; 21 UG/1; UG/1
1 AEROSOL, METERED RESPIRATORY (INHALATION)
Refills: 0 | Status: DISCONTINUED | OUTPATIENT
Start: 2024-11-23 | End: 2024-11-27

## 2024-11-23 RX ORDER — PREGABALIN 75 MG/1
75 CAPSULE ORAL EVERY 8 HOURS
Refills: 0 | Status: DISCONTINUED | OUTPATIENT
Start: 2024-11-23 | End: 2024-11-27

## 2024-11-23 RX ORDER — AZITHROMYCIN 250 MG/1
500 TABLET, FILM COATED ORAL ONCE
Refills: 0 | Status: COMPLETED | OUTPATIENT
Start: 2024-11-23 | End: 2024-11-23

## 2024-11-23 RX ORDER — ACETAMINOPHEN 500MG 500 MG/1
650 TABLET, COATED ORAL EVERY 6 HOURS
Refills: 0 | Status: DISCONTINUED | OUTPATIENT
Start: 2024-11-23 | End: 2024-11-27

## 2024-11-23 RX ORDER — INFLUENZA VIRUS VACCINE 15; 15; 15; 15 UG/.5ML; UG/.5ML; UG/.5ML; UG/.5ML
0.5 SUSPENSION INTRAMUSCULAR ONCE
Refills: 0 | Status: DISCONTINUED | OUTPATIENT
Start: 2024-11-23 | End: 2024-11-27

## 2024-11-23 RX ORDER — IPRATROPIUM BROMIDE AND ALBUTEROL SULFATE 2.5; .5 MG/3ML; MG/3ML
3 SOLUTION RESPIRATORY (INHALATION)
Refills: 0 | Status: COMPLETED | OUTPATIENT
Start: 2024-11-23 | End: 2024-11-23

## 2024-11-23 RX ORDER — CEFTRIAXONE SODIUM 1 G
1000 VIAL (EA) INJECTION EVERY 24 HOURS
Refills: 0 | Status: DISCONTINUED | OUTPATIENT
Start: 2024-11-24 | End: 2024-11-27

## 2024-11-23 RX ORDER — ZOLPIDEM TARTRATE 10 MG/1
5 TABLET ORAL AT BEDTIME
Refills: 0 | Status: DISCONTINUED | OUTPATIENT
Start: 2024-11-23 | End: 2024-11-27

## 2024-11-23 RX ORDER — METHYLPREDNISOLONE SOD SUCC 125 MG
125 VIAL (EA) INJECTION ONCE
Refills: 0 | Status: COMPLETED | OUTPATIENT
Start: 2024-11-23 | End: 2024-11-23

## 2024-11-23 RX ORDER — SODIUM CHLORIDE 9 MG/ML
1000 INJECTION, SOLUTION INTRAMUSCULAR; INTRAVENOUS; SUBCUTANEOUS ONCE
Refills: 0 | Status: COMPLETED | OUTPATIENT
Start: 2024-11-23 | End: 2024-11-23

## 2024-11-23 RX ORDER — MAGNESIUM, ALUMINUM HYDROXIDE 200-225/5
30 SUSPENSION, ORAL (FINAL DOSE FORM) ORAL EVERY 4 HOURS
Refills: 0 | Status: DISCONTINUED | OUTPATIENT
Start: 2024-11-23 | End: 2024-11-27

## 2024-11-23 RX ORDER — GUAIFENESIN 400 MG
100 TABLET ORAL EVERY 6 HOURS
Refills: 0 | Status: DISCONTINUED | OUTPATIENT
Start: 2024-11-23 | End: 2024-11-23

## 2024-11-23 RX ORDER — IPRATROPIUM BROMIDE AND ALBUTEROL SULFATE 2.5; .5 MG/3ML; MG/3ML
3 SOLUTION RESPIRATORY (INHALATION) EVERY 6 HOURS
Refills: 0 | Status: DISCONTINUED | OUTPATIENT
Start: 2024-11-23 | End: 2024-11-27

## 2024-11-23 RX ORDER — METHYLPREDNISOLONE SOD SUCC 125 MG
40 VIAL (EA) INJECTION EVERY 8 HOURS
Refills: 0 | Status: DISCONTINUED | OUTPATIENT
Start: 2024-11-23 | End: 2024-11-25

## 2024-11-23 RX ORDER — CEFTRIAXONE SODIUM 1 G
1000 VIAL (EA) INJECTION ONCE
Refills: 0 | Status: COMPLETED | OUTPATIENT
Start: 2024-11-23 | End: 2024-11-23

## 2024-11-23 RX ORDER — METHYLPREDNISOLONE SOD SUCC 125 MG
40 VIAL (EA) INJECTION EVERY 12 HOURS
Refills: 0 | Status: DISCONTINUED | OUTPATIENT
Start: 2024-11-23 | End: 2024-11-23

## 2024-11-23 RX ORDER — ALLOPURINOL 300 MG/1
100 TABLET ORAL DAILY
Refills: 0 | Status: DISCONTINUED | OUTPATIENT
Start: 2024-11-23 | End: 2024-11-27

## 2024-11-23 RX ORDER — AZITHROMYCIN 250 MG/1
500 TABLET, FILM COATED ORAL EVERY 24 HOURS
Refills: 0 | Status: DISCONTINUED | OUTPATIENT
Start: 2024-11-24 | End: 2024-11-26

## 2024-11-23 RX ORDER — ACETAMINOPHEN, DIPHENHYDRAMINE HCL, PHENYLEPHRINE HCL 325; 25; 5 MG/1; MG/1; MG/1
3 TABLET ORAL AT BEDTIME
Refills: 0 | Status: DISCONTINUED | OUTPATIENT
Start: 2024-11-23 | End: 2024-11-27

## 2024-11-23 RX ORDER — PREGABALIN 75 MG/1
75 CAPSULE ORAL DAILY
Refills: 0 | Status: DISCONTINUED | OUTPATIENT
Start: 2024-11-23 | End: 2024-11-23

## 2024-11-23 RX ORDER — ONDANSETRON HYDROCHLORIDE 4 MG/1
4 TABLET, FILM COATED ORAL EVERY 8 HOURS
Refills: 0 | Status: DISCONTINUED | OUTPATIENT
Start: 2024-11-23 | End: 2024-11-27

## 2024-11-23 RX ORDER — MONTELUKAST SODIUM 10 MG/1
10 TABLET ORAL DAILY
Refills: 0 | Status: DISCONTINUED | OUTPATIENT
Start: 2024-11-23 | End: 2024-11-27

## 2024-11-23 RX ORDER — ENOXAPARIN SODIUM 30 MG/.3ML
40 INJECTION SUBCUTANEOUS EVERY 24 HOURS
Refills: 0 | Status: DISCONTINUED | OUTPATIENT
Start: 2024-11-23 | End: 2024-11-27

## 2024-11-23 RX ADMIN — PREGABALIN 75 MILLIGRAM(S): 75 CAPSULE ORAL at 21:53

## 2024-11-23 RX ADMIN — IPRATROPIUM BROMIDE AND ALBUTEROL SULFATE 3 MILLILITER(S): 2.5; .5 SOLUTION RESPIRATORY (INHALATION) at 16:05

## 2024-11-23 RX ADMIN — AZITHROMYCIN 255 MILLIGRAM(S): 250 TABLET, FILM COATED ORAL at 18:38

## 2024-11-23 RX ADMIN — Medication 100 MILLIGRAM(S): at 17:29

## 2024-11-23 RX ADMIN — IPRATROPIUM BROMIDE AND ALBUTEROL SULFATE 3 MILLILITER(S): 2.5; .5 SOLUTION RESPIRATORY (INHALATION) at 21:57

## 2024-11-23 RX ADMIN — SODIUM CHLORIDE 1000 MILLILITER(S): 9 INJECTION, SOLUTION INTRAMUSCULAR; INTRAVENOUS; SUBCUTANEOUS at 16:05

## 2024-11-23 RX ADMIN — ZOLPIDEM TARTRATE 5 MILLIGRAM(S): 10 TABLET ORAL at 23:27

## 2024-11-23 RX ADMIN — MONTELUKAST SODIUM 10 MILLIGRAM(S): 10 TABLET ORAL at 21:53

## 2024-11-23 RX ADMIN — SODIUM CHLORIDE 1000 MILLILITER(S): 9 INJECTION, SOLUTION INTRAMUSCULAR; INTRAVENOUS; SUBCUTANEOUS at 17:00

## 2024-11-23 RX ADMIN — Medication 125 MILLIGRAM(S): at 16:05

## 2024-11-23 RX ADMIN — IPRATROPIUM BROMIDE AND ALBUTEROL SULFATE 3 MILLILITER(S): 2.5; .5 SOLUTION RESPIRATORY (INHALATION) at 16:41

## 2024-11-23 RX ADMIN — Medication 40 MILLIGRAM(S): at 21:53

## 2024-11-23 RX ADMIN — Medication 1000 MILLIGRAM(S): at 18:00

## 2024-11-23 RX ADMIN — IPRATROPIUM BROMIDE AND ALBUTEROL SULFATE 3 MILLILITER(S): 2.5; .5 SOLUTION RESPIRATORY (INHALATION) at 16:21

## 2024-11-23 RX ADMIN — FLUTICASONE PROPIONATE AND SALMETEROL XINAFOATE 1 DOSE(S): 45; 21 AEROSOL, METERED RESPIRATORY (INHALATION) at 21:56

## 2024-11-23 NOTE — ED ADULT NURSE NOTE - NSICDXPASTSURGICALHX_GEN_ALL_CORE_FT
PAST SURGICAL HISTORY:  H/O left wrist surgery     H/O total knee replacement, left     History of ankle fusion     History of brain surgery

## 2024-11-23 NOTE — H&P ADULT - NSHPLABSRESULTS_GEN_ALL_CORE
13.5   7.56  )-----------( 196      ( 23 Nov 2024 16:00 )             39.8       11-23    142  |  106  |  26  ----------------------------<  126  4.3   |  29  |  1.00    Ca    8.8      23 Nov 2024 16:00    TPro  6.5  /  Alb  3.3  /  TBili  0.4  /  DBili  x   /  AST  27  /  ALT  24  /  AlkPhos  68  11-23    Urinalysis Basic - ( 23 Nov 2024 16:00 )    Color: x / Appearance: x / SG: x / pH: x  Gluc: 126 mg/dL / Ketone: x  / Bili: x / Urobili: x   Blood: x / Protein: x / Nitrite: x   Leuk Esterase: x / RBC: x / WBC x   Sq Epi: x / Non Sq Epi: x / Bacteria: x    COVID-19 PCR: NotDetec (11-23-24 @ 16:00)  COVID-19 PCR: NotDetec (23 Nov 2024 16:00)      RADIOLOGY  CT Chest No Cont (11.06.24 @ 10:24) >      IMPRESSION: Stable nodules in both lungs as described above when compared   to previous exam. Continued follow-up examination is recommended to   ensure stability.    Consultant(s) Notes Reveiwed x ] Yes   Pulmonary  Care Discussed with [ ]x Consultants  [x ] Patient  [ ] Family  [ ] /   [x ] Other; RN

## 2024-11-23 NOTE — ED PROVIDER NOTE - CARE PLAN
Principal Discharge DX:	Acute asthma exacerbation   1 Principal Discharge DX:	Acute asthma exacerbation  Secondary Diagnosis:	Pneumonia

## 2024-11-23 NOTE — PATIENT PROFILE ADULT - VISION (WITH CORRECTIVE LENSES IF THE PATIENT USUALLY WEARS THEM):
has glassses/Normal vision: sees adequately in most situations; can see medication labels, newsprint

## 2024-11-23 NOTE — H&P ADULT - NSHPREVIEWOFSYSTEMS_GEN_ALL_CORE
REVIEW OF SYSTEMS:  CONSTITUTIONAL: No fever, weight loss, or fatigue  EYES: No eye pain, visual disturbances, or discharge  ENMT:  No difficulty hearing, tinnitus, vertigo; No sinus or throat pain  NECK: No pain or stiffness  BREASTS: No pain, masses, or nipple discharge  RESPIRATORY: No cough, wheezing, chills or hemoptysis; No shortness of breath  CARDIOVASCULAR: No chest pain, palpitations, dizziness, or leg swelling  GASTROINTESTINAL: No abdominal or epigastric pain. No nausea, vomiting, or hematemesis; No diarrhea or constipation. No melena or hematochezia.  GENITOURINARY: No dysuria, frequency, hematuria, or incontinence  NEUROLOGICAL: No headaches, memory loss, loss of strength, numbness, or tremors  SKIN: No itching, burning, rashes, or lesions   LYMPH NODES: No enlarged glands  ENDOCRINE: No heat or cold intolerance; No hair loss  MUSCULOSKELETAL: No joint pain or swelling; No muscle, back, or extremity pain  PSYCHIATRIC: No depression, anxiety, mood swings, or difficulty sleeping  HEME/LYMPH: No easy bruising, or bleeding gums  ALLERY AND IMMUNOLOGIC: No hives or eczema    ALL ROS REVIEWED AND NORMAL EXCEPT AS STATED ABOVE REVIEW OF SYSTEMS:  CONSTITUTIONAL: No fever, weight loss, or fatigue  EYES: No eye pain, visual disturbances, or discharge  ENMT:  No difficulty hearing, tinnitus, vertigo; No sinus or throat pain  NECK: No pain or stiffness  BREASTS: No pain, masses, or nipple discharge  RESPIRATORY: SOB, productive cough  CARDIOVASCULAR: No chest pain, palpitations, dizziness, or leg swelling  GASTROINTESTINAL: No abdominal or epigastric pain. No nausea, vomiting, or hematemesis; No diarrhea or constipation. No melena or hematochezia.  GENITOURINARY: No dysuria, frequency, hematuria, or incontinence  NEUROLOGICAL: No headaches, memory loss, loss of strength, numbness, or tremors  SKIN: No itching, burning, rashes, or lesions   LYMPH NODES: No enlarged glands  ENDOCRINE: No heat or cold intolerance; No hair loss  MUSCULOSKELETAL: No joint pain or swelling; No muscle, back, or extremity pain  PSYCHIATRIC: No depression, anxiety, mood swings, or difficulty sleeping  HEME/LYMPH: No easy bruising, or bleeding gums  ALLERY AND IMMUNOLOGIC: No hives or eczema    ALL ROS REVIEWED AND NORMAL EXCEPT AS STATED ABOVE

## 2024-11-23 NOTE — H&P ADULT - NSHPPHYSICALEXAM_GEN_ALL_CORE
Vital Signs Last 24 Hrs  T(F): 98.8 (23 Nov 2024 15:07), Max: 98.8 (23 Nov 2024 15:07)  HR: 96 (23 Nov 2024 17:00) (82 - 96)  BP: 137/56 (23 Nov 2024 17:00) (131/65 - 139/71)  RR: 15 (23 Nov 2024 17:00) (15 - 20)  SpO2: 93% (23 Nov 2024 17:00) (93% - 95%)    PHYSICAL EXAM:  GENERAL: NAD, well-groomed, well-developed  HEAD:  Atraumatic, Normocephalic  EYES: EOMI, conjunctiva and sclera clear  ENMT: Moist mucous membranes, Good dentition, no thrush  NECK: Supple, No JVD  CHEST/LUNG: Clear to auscultation bilaterally, good air entry, non-labored breathing  HEART: RRR; S1/S2, No murmur  ABDOMEN: Soft, Nontender, Nondistended; Bowel sounds present  VASCULAR: Normal pulses, Normal capillary refill  EXTREMITIES: No calf tenderness, No cyanosis, No edema  LYMPH: Normal; No lymphadenopathy noted  SKIN: Warm, Intact  PSYCH: Normal mood, Normal affect  NERVOUS SYSTEM:  A/O x3, Good concentration; CN 2-12 intact, No focal deficits Vital Signs Last 24 Hrs  T(F): 98.8 (23 Nov 2024 15:07), Max: 98.8 (23 Nov 2024 15:07)  HR: 96 (23 Nov 2024 17:00) (82 - 96)  BP: 137/56 (23 Nov 2024 17:00) (131/65 - 139/71)  RR: 15 (23 Nov 2024 17:00) (15 - 20)  SpO2: 93% (23 Nov 2024 17:00) (93% - 95%)    PHYSICAL EXAM:  GENERAL: NAD, well-groomed, well-developed  HEAD:  Atraumatic, Normocephalic  EYES: EOMI, conjunctiva and sclera clear  ENMT: Moist mucous membranes, Good dentition, no thrush  NECK: Supple, No JVD  CHEST/LUNG:Decreased air entry bilateral, expiratory wheezing noted  HEART: RRR; S1/S2, No murmur  ABDOMEN: Soft, Nontender, Nondistended; Bowel sounds present  VASCULAR: Normal pulses, Normal capillary refill  EXTREMITIES: RLE swollen more than left, with some edema  LYMPH: Normal; No lymphadenopathy noted  SKIN: Warm, Intact  PSYCH: Normal mood, Normal affect  NERVOUS SYSTEM:  A/O x3, Good concentration; CN 2-12 intact, No focal deficits

## 2024-11-23 NOTE — PATIENT PROFILE ADULT - ABILITY TO HEAR (WITH HEARING AID OR HEARING APPLIANCE IF NORMALLY USED):
He has Hearing aide but didn't bring it here/Mildly to Moderately Impaired: difficulty hearing in some environments or speaker may need to increase volume or speak distinctly

## 2024-11-23 NOTE — ED ADULT NURSE NOTE - OBJECTIVE STATEMENT
Patient received A&Ox4, BIBEMS from home. Patient complains of "low oxygen" at home. States he checked with an at home pulse ox and noted to measure 93%. Patient without SOB, CP. No fevers at present. Recent viral illness diagnosis and on prednisone. Cardiac monitor and pulse ox applied, satting WNL. Respirations even and unlabored. Side rails up, call light in reach, safety maintained, comfort measures provided and MD evaluation in progress. Patient received A&Ox4, BIBEMS from home. Patient complains of "low oxygen" at home. States he checked with an at home pulse ox and noted to measure 93%. Patient without SOB, CP. No fevers at present. Recent viral illness diagnosis and on prednisone. Cardiac monitor and pulse ox applied, satting WNL. Respirations even and unlabored, wheezing and cough noted. Side rails up, call light in reach, safety maintained, comfort measures provided and MD evaluation in progress.

## 2024-11-23 NOTE — CONSULT NOTE ADULT - SUBJECTIVE AND OBJECTIVE BOX
Time of visit:    CHIEF COMPLAINT: Patient is a 79y old  Male who presents with a chief complaint of Shortness of breath (23 Nov 2024 17:27)      HPI: This is a 79 yr old man  from home with  recent diagnosed prostate cancer, s/p transurethral focal water vapor ablation on 07/01/24, asthma (controlled no recent hosp or exacerbation), gout, former smoker (quit 1990) and lung nodule, cerebral aneurysm (sx 2019 [place]), PVD, osteoarthritis presenting to ER with cc of SOB for the past couple of weeks.  Since September, he has been having chronic SOB.  He went to see his pulmonologist Dr Nash who diagnosed him with a viral infection in Sept; she followed up with him last week and noted that he had increased wheezing.  She placed him on azithromycin and prednisone which he completed.  He also performed a CT chest on 11/6 to follow up on lung nodules; he was told they were stable.    For the past week, he has had increased SOB with a productive cough of yellow sputum; he denies fever, chest pain, N/V, abd pain, travel hx, sick contacts.  He checked his pulse ox at home and noted it to be between 90-92%.  His SOB has progressively worsened and he decided to come to the ER today    In the ER, he was given CTX, azithromycin and albuterol (23 Nov 2024 17:27)   Patient seen and examined.     PAST MEDICAL & SURGICAL HISTORY:  Neuropathy due to peripheral vascular disease  Prostate cancer  Asthma  Gout  History of cerebral aneurysm  Osteoarthritis  H/O left wrist surgery  History of brain surgery  H/O total knee replacement, left  History of ankle fusion    Allergies: NKDA    MEDICATIONS  (STANDING):  albuterol/ipratropium for Nebulization 3 milliLiter(s) Nebulizer every 6 hours  allopurinol 100 milliGRAM(s) Oral daily  aspirin enteric coated 81 milliGRAM(s) Oral daily  enoxaparin Injectable 40 milliGRAM(s) SubCutaneous every 24 hours  methylPREDNISolone sodium succinate Injectable 40 milliGRAM(s) IV Push every 8 hours  pregabalin 75 milliGRAM(s) Oral every 8 hours      MEDICATIONS  (PRN):  acetaminophen     Tablet .. 650 milliGRAM(s) Oral every 6 hours PRN Temp greater or equal to 38C (100.4F), Mild Pain (1 - 3)  aluminum hydroxide/magnesium hydroxide/simethicone Suspension 30 milliLiter(s) Oral every 4 hours PRN Dyspepsia  guaiFENesin Oral Liquid (Sugar-Free) 100 milliGRAM(s) Oral every 6 hours PRN Cough  melatonin 3 milliGRAM(s) Oral at bedtime PRN Insomnia  ondansetron Injectable 4 milliGRAM(s) IV Push every 8 hours PRN Nausea and/or Vomiting  zolpidem 5 milliGRAM(s) Oral at bedtime PRN Insomnia   Medications up to date at time of exam.    Medications up to date at time of exam.    FAMILY HISTORY:  FHx: heart disease (Father)        SOCIAL HISTORY  Smoking History: [   ] smoking/smoke exposure, [x    ] former smoker 1PPD  x 35 hr plus second hand smoke from wife , Quit 1993   Living Condition: [   ] apartment, [   ] private house  Work History:  Retired  / power    Travel History: denies recent travel  Illicit Substance Use: denies  Alcohol Use: denies    REVIEW OF SYSTEMS:    CONSTITUTIONAL:  denies fevers, chills, sweats, weight loss    HEENT:  denies diplopia or blurred vision, sore throat or runny nose.    CARDIOVASCULAR:  denies pressure, squeezing, tightness, or heaviness about the chest; no palpitations.    RESPIRATORY:  denies SOB, cough, MARSH, wheezing.    GASTROINTESTINAL:  denies abdominal pain, nausea, vomiting or diarrhea.    GENITOURINARY: denies dysuria, frequency or urgency.    NEUROLOGIC:  denies numbness, tingling, seizures or weakness.    PSYCHIATRIC:  denies disorder of thought or mood.    MSK: denies swelling, redness      PHYSICAL EXAMINATION:    GENERAL: The patient  in no apparent distress.     Vital Signs Last 24 Hrs  T(C): 36.8 (23 Nov 2024 18:41), Max: 37.1 (23 Nov 2024 15:07)  T(F): 98.2 (23 Nov 2024 18:41), Max: 98.8 (23 Nov 2024 15:07)  HR: 82 (23 Nov 2024 18:41) (82 - 96)  BP: 118/75 (23 Nov 2024 18:41) (118/75 - 139/71)  BP(mean): --  RR: 16 (23 Nov 2024 18:41) (15 - 20)  SpO2: 95% (23 Nov 2024 18:41) (93% - 95%)    Parameters below as of 23 Nov 2024 18:41  Patient On (Oxygen Delivery Method): room air       (if applicable)    Chest Tube (if applicable)    HEENT: Head is normocephalic and atraumatic. Extraocular muscles are intact. Mucous membranes are moist.     NECK: Supple, no palpable adenopathy.    LUNGS: Fair b/l breath sounds, b/l wheezing, with rhonchi     HEART: Regular rate and rhythm without murmur.    ABDOMEN: Soft, nontender, and nondistended.  No hepatosplenomegaly is noted.    RENAL: No difficulty voiding, no pelvic pain    EXTREMITIES: Without any cyanosis, clubbing, rash, lesions or edema.    NEUROLOGIC: Awake, alert, oriented, grossly intact    SKIN: Warm, dry, good turgor.      LABS:                        13.5   7.56  )-----------( 196      ( 23 Nov 2024 16:00 )             39.8     11-23    142  |  106  |  26[H]  ----------------------------<  126[H]  4.3   |  29  |  1.00    Ca    8.8      23 Nov 2024 16:00    TPro  6.5  /  Alb  3.3  /  TBili  0.4  /  DBili  x   /  AST  27  /  ALT  24  /  AlkPhos  68  11-23      Urinalysis Basic - ( 23 Nov 2024 16:00 )    Color: x / Appearance: x / SG: x / pH: x  Gluc: 126 mg/dL / Ketone: x  / Bili: x / Urobili: x   Blood: x / Protein: x / Nitrite: x   Leuk Esterase: x / RBC: x / WBC x   Sq Epi: x / Non Sq Epi: x / Bacteria: x  D-Dimer Assay, Quantitative: 213 ng/mL DDU (11-23-24 @ 16:00)    MICROBIOLOGY: (if applicable)  Resp Syn Virus Result: Detected (11.23.24 @ 16:00)      RADIOLOGY & ADDITIONAL STUDIES:  EKG:   CXR:  < from: CT Chest No Cont (11.06.24 @ 10:24) >  IMPRESSION: Stable nodules in both lungs as described above when compared   to previous exam. Continued follow-up examination is recommended to   ensure stability.      < end of copied text >  ECHO:    IMPRESSION: 79y Male PAST MEDICAL & SURGICAL HISTORY:  Neuropathy due to peripheral vascular disease      Prostate cancer      Asthma      Gout      History of cerebral aneurysm      Osteoarthritis      H/O left wrist surgery      History of brain surgery      H/O total knee replacement, left      History of ankle fusion       p/w           IMP:  This is a 79 yr old man   from home with  recent diagnosed prostate cancer, s/p transurethral focal water vapor ablation on 07/01/24, asthma (controlled no recent hosp or exacerbation), gout, former smoker (quit 1990) and lung nodule, cerebral aneurysm (sx 2019 [place]), PVD, osteoarthritis presenting to ER with cc of SOB for the past couple of weeks.  Since September, he has been having chronic SOB.  For the past week, he has had increased SOB with a productive cough of yellow sputum; he denies fever, chest pain,  Pat admitted for acute ex of asthma due to RSV infection .       ASSESSMENT     - Acute Ex of Asthma   - RSV infection   - Former smoker   - Hx of asthma   - Lung nodules  - Prostate CA   - Cerebral aneurysm          Sugg  - O2 supp as needed   - Duoneb q6h   - Solumedrol 40 mg Q12h   - Advair 250 /50 Q12h  - Spiriva qd   - Peak flow meter BID   - Asthma education   - Singulair 10 mg QD   - Hycodan 5 ml Q8h x 3 days   - Continue antibx until cultures are resulted   - Repeat CT chest in 6 months to monitor lung nodules   - DVT GI prophy   - Continue prostate ca work up as out patient   - Out pat pul f/u

## 2024-11-23 NOTE — H&P ADULT - HISTORY OF PRESENT ILLNESS
79 yr old male from home with PMH of recent diagnosed prostate cancer, s/p transurethral focal water vapor ablation on 07/01/24, asthma (controlled no recent hosp or exacerbation), gout, former smoker (quit 1990) and lung nodule, cerebral aneurysm (sx 2019 [place]), PVD, osteoarthritis presenting to ER with cc of SOB for the past couple of weeks.  He went to see his pulmonologist 79 yr old male from home with PMH of recent diagnosed prostate cancer, s/p transurethral focal water vapor ablation on 07/01/24, asthma (controlled no recent hosp or exacerbation), gout, former smoker (quit 1990) and lung nodule, cerebral aneurysm (sx 2019 [place]), PVD, osteoarthritis presenting to ER with cc of SOB for the past couple of weeks.  Since September, he has been having chronic SOB.  He went to see his pulmonologist Dr Nash who diagnosed him with a viral infection in Sept; she followed up with him last week and noted that he had increased wheezing.  She placed him on azithromycin and prednisone which he completed.  He also performed a CT chest on 11/6 to follow up on lung nodules; he was told they were stable.    For the past week, he has had increased SOB with a productive cough of yellow sputum; he denies fever, chest pain, N/V, abd pain, travel hx, sick contacts.  He checked his pulse ox at home and noted it to be between 90-92%.  His SOB has progressively worsened and he decided to come to the ER today    In the ER, he was given CTX, azithromycin and albuterol

## 2024-11-23 NOTE — ED ADULT TRIAGE NOTE - CHIEF COMPLAINT QUOTE
Patient brought in by EMS from home with complaint of low oxygen saturation, pulse ox 92-93% at home. Patient was recently diagnosed with viral illness and started on prednisone. Denies any SOB or chest pain.

## 2024-11-23 NOTE — H&P ADULT - ASSESSMENT
79 yr old male from home with PMH of recent diagnosed prostate cancer, s/p transurethral focal water vapor ablation on 07/01/24, asthma (controlled no recent hosp or exacerbation), gout, former smoker (quit 1990) and lung nodule, cerebral aneurysm (sx 2019 [place]), PVD, osteoarthritis presenting to ER with cc of SOB for the past couple of weeks.      #Shortness of breath possibly related to acute asthma exacerbation with community acquired PNA with hx of bilateral lung nodules   - Will admit to medicine with telemetry and pulse oximetry  - Will consult pulmonary Dr Henley, notified  - Noted recent CT chest done 11/6 as above  - Will c/w nebulizer treatments q 6hrs, solumedrol 40mg IV q 12hrs  - Will c/w ceftriaxone and azithromycin  - Will check STAT troponin, not done in ER  - Will check TTE, EKG in AM  - D-dimer noted to be negative  - Incentive spirometer, OOB chair, PT eval    #Recent prostate cancer, s/p surgery    #Gout    #Hx of cerebral neurysm 2019    #OA    DVT PPX: Lovenox  AM labs  Full code  DISP Pending course   79 yr old male from home with PMH of recent diagnosed prostate cancer, s/p transurethral focal water vapor ablation on 07/01/24, asthma (controlled no recent hosp or exacerbation), gout, former smoker (quit 1990) and lung nodule, cerebral aneurysm (sx 2019 [place]), PVD, osteoarthritis presenting to ER with cc of SOB for the past couple of weeks.      #Shortness of breath possibly related to acute asthma exacerbation with community acquired PNA with hx of bilateral lung nodules and recent viral infection  - Will admit to medicine with telemetry and pulse oximetry  - Will consult pulmonary Dr Henley, notified  - Noted recent CT chest done 11/6 as above; f/u pulm outpatient for repeat imaging  - Will c/w nebulizer treatments q 6hrs, solumedrol 40mg IV q 8hrs  - Will c/w ceftriaxone and azithromycin, robitussin prn  - Will order STAT flu/RSV/COVID panel  - Will check STAT troponin, not done in ER  - Will check TTE, EKG in AM  - D-dimer noted to be negative  - Incentive spirometer, OOB chair, PT eval    #Recent prostate cancer, s/p surgery  - F/u outpatient; not on further treatment    #Gout  - C/w allopurinol    #Hx of cerebral neurysm 2019  - Stable    #OA  #Neuropathy  - Lyrica 75mg TID  - Will check ULT LE To r/o DVT    #Insomnia  - C/w ambien 5mg qhs prn    DVT PPX: Lovenox  AM labs  Full code  DISP Pending course  Aaron Houston at bedside [phone], answered all questions, in agreement with care plan

## 2024-11-23 NOTE — ED ADULT NURSE REASSESSMENT NOTE - NS ED NURSE REASSESS COMMENT FT1
Trialed ambulation. PCA at bedside. Upon standing, patient endorsing dizziness and feeling generalized weakness when standing. Patient returned to stretcher at this time with resolve in symptoms upon sitting down. Event endorsed to MD Lozano. Remains on cardiac and pulse ox monitoring.

## 2024-11-23 NOTE — PATIENT PROFILE ADULT - FALL HARM RISK - HARM RISK INTERVENTIONS
Assistance with ambulation/Assistance OOB with selected safe patient handling equipment/Communicate Risk of Fall with Harm to all staff/Discuss with provider need for PT consult/Monitor gait and stability/Provide patient with walking aids - walker, cane, crutches/Reinforce activity limits and safety measures with patient and family/Sit up slowly, dangle for a short time, stand at bedside before walking/Tailored Fall Risk Interventions/Visual Cue: Yellow wristband and red socks/Bed in lowest position, wheels locked, appropriate side rails in place/Call bell, personal items and telephone in reach/Instruct patient to call for assistance before getting out of bed or chair/Non-slip footwear when patient is out of bed/[place] to call system/Physically safe environment - no spills, clutter or unnecessary equipment/Purposeful Proactive Rounding/Room/bathroom lighting operational, light cord in reach

## 2024-11-23 NOTE — ED ADULT NURSE NOTE - NS ED NURSE RECORD ANOTHER VITAL SIGN
Discharge planning completed by Stella Calhoun RN     06/25/20 [id number]   Discharge Assessment   Assessment Type Discharge Planning Assessment      Yes

## 2024-11-23 NOTE — ED ADULT NURSE NOTE - NSICDXPASTMEDICALHX_GEN_ALL_CORE_FT
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

## 2024-11-23 NOTE — ED PROVIDER NOTE - OBJECTIVE STATEMENT
79-year-old male with a history of moderate asthma, gout, former smoker, prostate cancer status post resection presents with cough shortness of breath x 1 month.  Patient admits about 4 weeks of a cough progressively worsening over the last week.  Admits today noticed his pulse ox was around 92%.  Feeling mild headache.  Denies any fevers chills sick contacts chest pain shortness of breath at rest.  Patient admits he saw his pulmonologist and was started on prednisone and azithromycin about a week ago. 79-year-old male with a history of moderate asthma, gout, former smoker, prostate cancer status post resection presents with cough shortness of breath x 1 month.  Patient admits about 4 weeks of a cough progressively worsening over the last week.  Admits today noticed his pulse ox was around 92%.  Feeling mild headache.  Denies any fevers chills sick contacts chest pain shortness of breath at rest.  Patient admits he saw his pulmonologist and was started on prednisone and azithromycin about a week ago.  No ho PE, DVT, leg swelling, recent surgery

## 2024-11-23 NOTE — ED PROVIDER NOTE - PHYSICAL EXAMINATION
CONSTITUTIONAL: NAD  SKIN: Warm dry  HEAD: NCAT  EYES: NL inspection  ENT: MMM  NECK: Supple  CARD: RRR  RESP: coarse BS BL, BL wheeze, no use of accessory muscles, sitting comfortably talking full sentences   NEURO: Grossly unremarkable  PSYCH: Cooperative, appropriate.

## 2024-11-23 NOTE — ED PROVIDER NOTE - CLINICAL SUMMARY MEDICAL DECISION MAKING FREE TEXT BOX
79-year-old male with a history of moderate asthma, gout, former smoker, prostate cancer status post resection presents with cough shortness of breath x 1 month.  Patient admits about 4 weeks of a cough progressively worsening over the last week.  Admits today noticed his pulse ox was around 92%.  Feeling mild headache.  Denies any fevers chills sick contacts chest pain shortness of breath at rest.  Patient admits he saw his pulmonologist and was started on prednisone and azithromycin about a week ago.  Exam as stated  Patient with likely asthma exacerbation.  Has pneumonia.  Will send labs imaging treatment for asthma.  Will do an ambulatory who 79-year-old male with a history of moderate asthma, gout, former smoker, prostate cancer status post resection presents with cough shortness of breath x 1 month.  Patient admits about 4 weeks of a cough progressively worsening over the last week.  Admits today noticed his pulse ox was around 92%.  Feeling mild headache.  Denies any fevers chills sick contacts chest pain shortness of breath at rest.  Patient admits he saw his pulmonologist and was started on prednisone and azithromycin about a week ago.  Exam as stated  Patient with likely asthma exacerbation.  +/- pneumonia.  Will send labs imaging treatment for asthma.  Will do an ambulatory O2    X-ray with right lower lobe opacity.  Attempted ambulation trial but patient acutely short of breath and not able to tolerate.  As per patient and son at bedside patient lives alone.  Plan to admit for asthma exacerbation pneumonia.  EKG sinus with PACs, LBB no comparison, no chest pain.   Patient to be admitted to an inpatient floor. Pt/family notified and agreeable to plan. Case discussed with and care endorsed to hospitalist. 79-year-old male with a history of moderate asthma, gout, former smoker, prostate cancer status post resection presents with cough shortness of breath x 1 month.  Patient admits about 4 weeks of a cough progressively worsening over the last week.  Admits today noticed his pulse ox was around 92%.  Feeling mild headache.  Denies any fevers chills sick contacts chest pain shortness of breath at rest.  Patient admits he saw his pulmonologist and was started on prednisone and azithromycin about a week ago.  No ho PE, DVT, leg swelling, recent surgery  Exam as stated  Patient with likely asthma exacerbation.  +/- pneumonia.  Will send labs imaging treatment for asthma.  Will do an ambulatory O2  Dimer negative  X-ray with right lower lobe opacity.  Attempted ambulation trial but patient acutely short of breath and not able to tolerate.  As per patient and son at bedside patient lives alone.  Plan to admit for asthma exacerbation pneumonia.  EKG sinus with PACs, LBB no comparison, no chest pain.   Patient to be admitted to an inpatient floor. Pt/family notified and agreeable to plan. Case discussed with and care endorsed to hospitalist.

## 2024-11-23 NOTE — ED PROVIDER NOTE - NS ED ATTENDING STATEMENT MOD
Add 52 Modifier (Optional): no
Show Applicator Variable?: Yes
Medical Necessity Information: It is in your best interest to select a reason for this procedure from the list below. All of these items fulfill various CMS LCD requirements except the new and changing color options.
Post-Care Instructions: I reviewed with the patient in detail post-care instructions. Patient is to wear sunprotection, and avoid picking at any of the treated lesions. Pt may apply Vaseline to crusted or scabbing areas.
Medical Necessity Clause: This procedure was medically necessary because the lesions that were treated were:
Consent: The patient's consent was obtained including but not limited to risks of crusting, scabbing, blistering, scarring, darker or lighter pigmentary change, recurrence, incomplete removal and infection.
Detail Level: Detailed
Attending Only

## 2024-11-24 LAB
A1C WITH ESTIMATED AVERAGE GLUCOSE RESULT: 5.5 % — SIGNIFICANT CHANGE UP (ref 4–5.6)
ANION GAP SERPL CALC-SCNC: 6 MMOL/L — SIGNIFICANT CHANGE UP (ref 5–17)
BUN SERPL-MCNC: 19 MG/DL — SIGNIFICANT CHANGE UP (ref 7–23)
CALCIUM SERPL-MCNC: 8.8 MG/DL — SIGNIFICANT CHANGE UP (ref 8.4–10.5)
CHLORIDE SERPL-SCNC: 105 MMOL/L — SIGNIFICANT CHANGE UP (ref 96–108)
CHOLEST SERPL-MCNC: 166 MG/DL — SIGNIFICANT CHANGE UP
CO2 SERPL-SCNC: 28 MMOL/L — SIGNIFICANT CHANGE UP (ref 22–31)
CREAT SERPL-MCNC: 0.91 MG/DL — SIGNIFICANT CHANGE UP (ref 0.5–1.3)
EGFR: 86 ML/MIN/1.73M2 — SIGNIFICANT CHANGE UP
ESTIMATED AVERAGE GLUCOSE: 111 MG/DL — SIGNIFICANT CHANGE UP (ref 68–114)
GLUCOSE SERPL-MCNC: 151 MG/DL — HIGH (ref 70–99)
HCT VFR BLD CALC: 39.9 % — SIGNIFICANT CHANGE UP (ref 39–50)
HDLC SERPL-MCNC: 60 MG/DL — SIGNIFICANT CHANGE UP
HGB BLD-MCNC: 13.2 G/DL — SIGNIFICANT CHANGE UP (ref 13–17)
LIPID PNL WITH DIRECT LDL SERPL: 96 MG/DL — SIGNIFICANT CHANGE UP
MAGNESIUM SERPL-MCNC: 2.1 MG/DL — SIGNIFICANT CHANGE UP (ref 1.6–2.6)
MCHC RBC-ENTMCNC: 30.1 PG — SIGNIFICANT CHANGE UP (ref 27–34)
MCHC RBC-ENTMCNC: 33.1 G/DL — SIGNIFICANT CHANGE UP (ref 32–36)
MCV RBC AUTO: 91.1 FL — SIGNIFICANT CHANGE UP (ref 80–100)
NON HDL CHOLESTEROL: 106 MG/DL — SIGNIFICANT CHANGE UP
NRBC # BLD: 0 /100 WBCS — SIGNIFICANT CHANGE UP (ref 0–0)
PLATELET # BLD AUTO: 186 K/UL — SIGNIFICANT CHANGE UP (ref 150–400)
POTASSIUM SERPL-MCNC: 4.6 MMOL/L — SIGNIFICANT CHANGE UP (ref 3.5–5.3)
POTASSIUM SERPL-SCNC: 4.6 MMOL/L — SIGNIFICANT CHANGE UP (ref 3.5–5.3)
RBC # BLD: 4.38 M/UL — SIGNIFICANT CHANGE UP (ref 4.2–5.8)
RBC # FLD: 13.8 % — SIGNIFICANT CHANGE UP (ref 10.3–14.5)
SODIUM SERPL-SCNC: 139 MMOL/L — SIGNIFICANT CHANGE UP (ref 135–145)
TRIGL SERPL-MCNC: 46 MG/DL — SIGNIFICANT CHANGE UP
WBC # BLD: 7.42 K/UL — SIGNIFICANT CHANGE UP (ref 3.8–10.5)
WBC # FLD AUTO: 7.42 K/UL — SIGNIFICANT CHANGE UP (ref 3.8–10.5)

## 2024-11-24 PROCEDURE — 93970 EXTREMITY STUDY: CPT | Mod: 26

## 2024-11-24 PROCEDURE — 99233 SBSQ HOSP IP/OBS HIGH 50: CPT | Mod: GC

## 2024-11-24 RX ADMIN — MONTELUKAST SODIUM 10 MILLIGRAM(S): 10 TABLET ORAL at 12:30

## 2024-11-24 RX ADMIN — Medication 40 MILLIGRAM(S): at 22:49

## 2024-11-24 RX ADMIN — IPRATROPIUM BROMIDE AND ALBUTEROL SULFATE 3 MILLILITER(S): 2.5; .5 SOLUTION RESPIRATORY (INHALATION) at 05:19

## 2024-11-24 RX ADMIN — IPRATROPIUM BROMIDE AND ALBUTEROL SULFATE 3 MILLILITER(S): 2.5; .5 SOLUTION RESPIRATORY (INHALATION) at 21:29

## 2024-11-24 RX ADMIN — AZITHROMYCIN 255 MILLIGRAM(S): 250 TABLET, FILM COATED ORAL at 12:31

## 2024-11-24 RX ADMIN — ZOLPIDEM TARTRATE 5 MILLIGRAM(S): 10 TABLET ORAL at 22:49

## 2024-11-24 RX ADMIN — PREGABALIN 75 MILLIGRAM(S): 75 CAPSULE ORAL at 22:49

## 2024-11-24 RX ADMIN — Medication 2 PUFF(S): at 08:58

## 2024-11-24 RX ADMIN — Medication 40 MILLIGRAM(S): at 05:42

## 2024-11-24 RX ADMIN — ALLOPURINOL 100 MILLIGRAM(S): 300 TABLET ORAL at 12:30

## 2024-11-24 RX ADMIN — Medication 81 MILLIGRAM(S): at 12:30

## 2024-11-24 RX ADMIN — Medication 40 MILLIGRAM(S): at 14:04

## 2024-11-24 RX ADMIN — FLUTICASONE PROPIONATE AND SALMETEROL XINAFOATE 1 DOSE(S): 45; 21 AEROSOL, METERED RESPIRATORY (INHALATION) at 21:29

## 2024-11-24 RX ADMIN — FLUTICASONE PROPIONATE AND SALMETEROL XINAFOATE 1 DOSE(S): 45; 21 AEROSOL, METERED RESPIRATORY (INHALATION) at 08:58

## 2024-11-24 RX ADMIN — IPRATROPIUM BROMIDE AND ALBUTEROL SULFATE 3 MILLILITER(S): 2.5; .5 SOLUTION RESPIRATORY (INHALATION) at 15:53

## 2024-11-24 RX ADMIN — PREGABALIN 75 MILLIGRAM(S): 75 CAPSULE ORAL at 05:42

## 2024-11-24 RX ADMIN — PREGABALIN 75 MILLIGRAM(S): 75 CAPSULE ORAL at 14:11

## 2024-11-24 RX ADMIN — ENOXAPARIN SODIUM 40 MILLIGRAM(S): 30 INJECTION SUBCUTANEOUS at 06:32

## 2024-11-24 RX ADMIN — Medication 100 MILLIGRAM(S): at 18:30

## 2024-11-24 NOTE — PHYSICAL THERAPY INITIAL EVALUATION ADULT - ADDITIONAL COMMENTS
Pt lives in an elevator building, no stairs. At baseline pt is independent with a SAC, independent with ADLs and drives. Pt's son Celso lives close by and checks in on pt. Pt has PT at home 2 x week, [facility].
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
## 2024-11-24 NOTE — PROGRESS NOTE ADULT - ASSESSMENT
Assessment    79 yr old man   from home with  recent diagnosed prostate cancer, s/p transurethral focal water vapor ablation on 07/01/24, asthma (controlled no recent hosp or exacerbation), gout, former smoker (quit 1990) and lung nodule, cerebral aneurysm (sx 2019 [place]), PVD, osteoarthritis presenting to ER with cc of SOB for the past couple of weeks.  Since September, he has been having chronic SOB.  For the past week, he has had increased SOB with a productive cough of yellow sputum; he denies fever, chest pain,  Pat admitted for acute ex of asthma due to RSV infection .     Problem List  - Acute bronchospasm in patient with RSV infection  - H/o Asthma with ? acute exacerbation due to RSV  - Former smoker - denies h/o COPD  - h/o abnormal CT/ lung nodules - CT here shows Stable nodules in both lungs as described above when compared to previous exam.   - Prostate CA   - Cerebral aneurysm          Plan  patient on room air today  wheezing, sob much better  taper Steroids.   Continue nebs  as on trelegy at home continue advair and spiriva while here  Singulair 10 mg QD   Hycodan 5 ml Q8h x 3 days for cough  if cultures negative can d/c antibiotics in am  Repeat CT chest in 6 months to monitor lung nodules   Out pat pulm f/u with Dr. Mason for f/u lung nodules

## 2024-11-24 NOTE — PROGRESS NOTE ADULT - ASSESSMENT
79 yr old male from home with PMH of recent diagnosed prostate cancer, s/p transurethral focal water vapor ablation on 07/01/24, asthma (controlled no recent hosp or exacerbation), gout, former smoker (quit 1990) and lung nodule, cerebral aneurysm (sx 2019 [place]), PVD, osteoarthritis presenting to ER with cc of SOB for the past couple of weeks.      #SOB 2/2 acute asthma exacerbation likely due to viral PNA   #hx of bilateral lung nodules   - admit to medicine with telemetry and pulse oximetry  - CT chest done 11/6 with stable lung nodule; f/u pulm outpatient for repeat imaging in 6 months (Out pat pulm f/u with Dr. Mason)  - RSV positive   - trop neg  - follow up TTE   - DDimer neg, no DVT on LE doppler   - Incentive spirometer, OOB chair  - PT eval: home PT   - pulm rec appreciated:        taper steroids (now on solumedrol 40mg q12hr)        c/w nebs        c/w advair and spiriva while here (pt on trelegy at home)       c/w singulair 10gm qd       hycodan 5ml Q8h x 3 days for cough       if clx neg, discontinue abx (Azithromycin, CTX)       #Recent prostate cancer, s/p surgery  - F/u outpatient; not on further treatment    #Gout  - C/w allopurinol    #Hx of cerebral neurysm 2019  - Stable    #OA  #Neuropathy  - Lyrica 75mg TID  - LE doppler neg DVT    #Insomnia  - C/w ambien 5mg qhs prn    DVT PPX: Lovenox  Full code  DISP pending culture and respiratory improvement   Aaron Houston at bedside [phone], answered all questions, in agreement with care plan    Discussed with Dr Koehler

## 2024-11-24 NOTE — PROGRESS NOTE ADULT - ATTENDING COMMENTS
79 yr old male from home with PMH of prostate cancer, mild intermittent asthma, gout, former smoker (quit 1990) and lung nodule, cerebral aneurysm, PVD, osteoarthritis, admitted with RSV infection.    T(C): 36.7 (11-24-24 @ 05:08), Max: 37.1 (11-23-24 @ 15:07)  T(F): 98 (11-24-24 @ 05:08), Max: 98.8 (11-23-24 @ 15:07)  HR: 68 (11-24-24 @ 09:05) (68 - 96)  BP: 160/83 (11-24-24 @ 05:08) (118/75 - 160/83)  ABP: --  ABP(mean): --  RR: 18 (11-24-24 @ 05:08) (15 - 20)  SpO2: 94% (11-24-24 @ 09:05) (93% - 95%)    on exam aaox3, no apparent distress, able to talk in full sentences on room air, both lungs with wheezing, s1, s2, regular, abdomen- soft, obese, b/l LE varicosities present                          13.2   7.42  )-----------( 186      ( 24 Nov 2024 06:02 )             39.9   11-24    139  |  105  |  19  ----------------------------<  151[H]  4.6   |  28  |  0.91    Ca    8.8      24 Nov 2024 06:02  Mg     2.1     11-24    TPro  6.5  /  Alb  3.3  /  TBili  0.4  /  DBili  x   /  AST  27  /  ALT  24  /  AlkPhos  68  11-23  RSV positive    a/p:  # acute excerbation of asthma secondary to RSV infection  # obesity, BMI - 31.8  # h/o prostate cancer, gout, PVD  - nebulizations, advair, iv steroids, taper as tolerated. continue singulair. ceftriaxone and azithromycin added for any underlying bacterial infection.   - contie home medications for chronic conditions  - rest as per resident note  disch dispo- medically active 48-72 hrs

## 2024-11-24 NOTE — PHYSICAL THERAPY INITIAL EVALUATION ADULT - PERTINENT HX OF CURRENT PROBLEM, REHAB EVAL
79 yr old male from home with PMH of recent diagnosed prostate cancer, s/p transurethral focal water vapor ablation on 07/01/24, asthma (controlled no recent hosp or exacerbation), gout, former smoker (quit 1990) and lung nodule, cerebral aneurysm (sx 2019 [place]), PVD, osteoarthritis presenting to ER with cc of SOB for the past couple of weeks.  Since September, he has been having chronic SOB.  He went to see his pulmonologist Dr Nash who diagnosed him with a viral infection in Sept; she followed up with him last week and noted that he had increased wheezing.  She placed him on azithromycin and prednisone which he completed.  He also performed a CT chest on 11/6 to follow up on lung nodules; he was told they were stable.    For the past week, he has had increased SOB with a productive cough of yellow sputum; he denies fever, chest pain, N/V, abd pain, travel hx, sick contacts.  He checked his pulse ox at home and noted it to be between 90-92%.  His SOB has progressively worsened and he decided to come to the ER today

## 2024-11-24 NOTE — SBIRT NOTE ADULT - NSSBIRTALCPOSREINDET_GEN_A_CORE
Pt denied any ETOH misuse or illicit drug use or misuse. Infrequent social recreational ETOH use 1-2 beers on occasion Positive reinforcement provided.

## 2024-11-25 ENCOUNTER — RESULT REVIEW (OUTPATIENT)
Age: 79
End: 2024-11-25

## 2024-11-25 LAB
ANION GAP SERPL CALC-SCNC: 6 MMOL/L — SIGNIFICANT CHANGE UP (ref 5–17)
BUN SERPL-MCNC: 24 MG/DL — HIGH (ref 7–23)
CALCIUM SERPL-MCNC: 8.9 MG/DL — SIGNIFICANT CHANGE UP (ref 8.4–10.5)
CHLORIDE SERPL-SCNC: 105 MMOL/L — SIGNIFICANT CHANGE UP (ref 96–108)
CO2 SERPL-SCNC: 29 MMOL/L — SIGNIFICANT CHANGE UP (ref 22–31)
CREAT SERPL-MCNC: 0.98 MG/DL — SIGNIFICANT CHANGE UP (ref 0.5–1.3)
EGFR: 79 ML/MIN/1.73M2 — SIGNIFICANT CHANGE UP
GLUCOSE SERPL-MCNC: 139 MG/DL — HIGH (ref 70–99)
HCT VFR BLD CALC: 39.7 % — SIGNIFICANT CHANGE UP (ref 39–50)
HGB BLD-MCNC: 13.3 G/DL — SIGNIFICANT CHANGE UP (ref 13–17)
MCHC RBC-ENTMCNC: 30.2 PG — SIGNIFICANT CHANGE UP (ref 27–34)
MCHC RBC-ENTMCNC: 33.5 G/DL — SIGNIFICANT CHANGE UP (ref 32–36)
MCV RBC AUTO: 90.2 FL — SIGNIFICANT CHANGE UP (ref 80–100)
NRBC # BLD: 0 /100 WBCS — SIGNIFICANT CHANGE UP (ref 0–0)
PLATELET # BLD AUTO: 199 K/UL — SIGNIFICANT CHANGE UP (ref 150–400)
POTASSIUM SERPL-MCNC: 4.5 MMOL/L — SIGNIFICANT CHANGE UP (ref 3.5–5.3)
POTASSIUM SERPL-SCNC: 4.5 MMOL/L — SIGNIFICANT CHANGE UP (ref 3.5–5.3)
RBC # BLD: 4.4 M/UL — SIGNIFICANT CHANGE UP (ref 4.2–5.8)
RBC # FLD: 14 % — SIGNIFICANT CHANGE UP (ref 10.3–14.5)
SODIUM SERPL-SCNC: 140 MMOL/L — SIGNIFICANT CHANGE UP (ref 135–145)
WBC # BLD: 11.22 K/UL — HIGH (ref 3.8–10.5)
WBC # FLD AUTO: 11.22 K/UL — HIGH (ref 3.8–10.5)

## 2024-11-25 PROCEDURE — 93306 TTE W/DOPPLER COMPLETE: CPT | Mod: 26

## 2024-11-25 PROCEDURE — 99233 SBSQ HOSP IP/OBS HIGH 50: CPT | Mod: GC

## 2024-11-25 RX ORDER — METHYLPREDNISOLONE SOD SUCC 125 MG
40 VIAL (EA) INJECTION EVERY 12 HOURS
Refills: 0 | Status: DISCONTINUED | OUTPATIENT
Start: 2024-11-25 | End: 2024-11-25

## 2024-11-25 RX ORDER — METHYLPREDNISOLONE SOD SUCC 125 MG
40 VIAL (EA) INJECTION EVERY 12 HOURS
Refills: 0 | Status: DISCONTINUED | OUTPATIENT
Start: 2024-11-25 | End: 2024-11-27

## 2024-11-25 RX ORDER — METHYLPREDNISOLONE SOD SUCC 125 MG
40 VIAL (EA) INJECTION EVERY 8 HOURS
Refills: 0 | Status: DISCONTINUED | OUTPATIENT
Start: 2024-11-25 | End: 2024-11-25

## 2024-11-25 RX ADMIN — Medication 40 MILLIGRAM(S): at 06:31

## 2024-11-25 RX ADMIN — PREGABALIN 75 MILLIGRAM(S): 75 CAPSULE ORAL at 06:31

## 2024-11-25 RX ADMIN — ZOLPIDEM TARTRATE 5 MILLIGRAM(S): 10 TABLET ORAL at 21:40

## 2024-11-25 RX ADMIN — MONTELUKAST SODIUM 10 MILLIGRAM(S): 10 TABLET ORAL at 12:02

## 2024-11-25 RX ADMIN — IPRATROPIUM BROMIDE AND ALBUTEROL SULFATE 3 MILLILITER(S): 2.5; .5 SOLUTION RESPIRATORY (INHALATION) at 05:24

## 2024-11-25 RX ADMIN — IPRATROPIUM BROMIDE AND ALBUTEROL SULFATE 3 MILLILITER(S): 2.5; .5 SOLUTION RESPIRATORY (INHALATION) at 15:00

## 2024-11-25 RX ADMIN — PREGABALIN 75 MILLIGRAM(S): 75 CAPSULE ORAL at 14:04

## 2024-11-25 RX ADMIN — AZITHROMYCIN 255 MILLIGRAM(S): 250 TABLET, FILM COATED ORAL at 12:03

## 2024-11-25 RX ADMIN — ALLOPURINOL 100 MILLIGRAM(S): 300 TABLET ORAL at 12:11

## 2024-11-25 RX ADMIN — Medication 40 MILLIGRAM(S): at 17:44

## 2024-11-25 RX ADMIN — FLUTICASONE PROPIONATE AND SALMETEROL XINAFOATE 1 DOSE(S): 45; 21 AEROSOL, METERED RESPIRATORY (INHALATION) at 20:49

## 2024-11-25 RX ADMIN — Medication 100 MILLIGRAM(S): at 17:36

## 2024-11-25 RX ADMIN — PREGABALIN 75 MILLIGRAM(S): 75 CAPSULE ORAL at 21:41

## 2024-11-25 RX ADMIN — Medication 81 MILLIGRAM(S): at 12:02

## 2024-11-25 RX ADMIN — Medication 40 MILLIGRAM(S): at 14:05

## 2024-11-25 RX ADMIN — IPRATROPIUM BROMIDE AND ALBUTEROL SULFATE 3 MILLILITER(S): 2.5; .5 SOLUTION RESPIRATORY (INHALATION) at 20:50

## 2024-11-25 RX ADMIN — ENOXAPARIN SODIUM 40 MILLIGRAM(S): 30 INJECTION SUBCUTANEOUS at 06:32

## 2024-11-25 NOTE — DIETITIAN INITIAL EVALUATION ADULT - ENERGY INTAKE
Pt with adequate energy intake since admission. Pt typically consuming ~% of meals provided.  Adequate (%)

## 2024-11-25 NOTE — DIETITIAN INITIAL EVALUATION ADULT - PERTINENT LABORATORY DATA
11-25    140  |  105  |  24[H]  ----------------------------<  139[H]  4.5   |  29  |  0.98    Ca    8.9      25 Nov 2024 06:08  Mg     2.1     11-24    TPro  6.5  /  Alb  3.3  /  TBili  0.4  /  DBili  x   /  AST  27  /  ALT  24  /  AlkPhos  68  11-23  A1C with Estimated Average Glucose Result: 5.5 % (11-24-24 @ 06:02)

## 2024-11-25 NOTE — PROGRESS NOTE ADULT - ATTENDING COMMENTS
Please see resident note for full details regarding the service.     PE: A+Ox3, no murmurs, lungs CTA b/l, and soft/NT/ND, no lower extremity swelling     Assessment:  - Acute bronchospasm secondary to RSV   - Asthma exacerbation   - Stable nodules in both lungs   - Obesity with BMI 31.8  - History of recent diagnosed prostate cancer, s/p transurethral focal water vapor ablation on 07/01/24, asthma (controlled no recent hosp or exacerbation), gout, former smoker (quit 1990) and lung nodule, cerebral aneurysm (sx 2019 [place]), PVD, osteoarthritis     Plan:   - Venous doppler: No evidence of deep venous thrombosis in either lower extremity.  - Continue nebulizers, Advair, Spiriva, Singulair   - On Solumedrol 40 mg Q8H -> still wheezing today, continue with current dose   - Continue Ceftriaxone and Azithromycin -> if cultures negative will d/c antibiotics   - Will need repeat CT chest in 6 months for surveillance of lung nodules   - PT: home PT   - DVT ppx: Lovenox   - Code status: Will confirm   - Dispo: Active. Tapering steroids, likely d/c in 24-48 hours     I spent a total of 50 minutes on the date of this encounter coordinating the patient's care, excluding resident teaching time. This includes reviewing results/imaging and discussions with specialists, nursing, case management/social work. Further tests, medications, and procedures have been ordered as indicated. Results and the plan of care were communicated to the patient and/or their family member. Supporting documentation was completed and added to the patient's chart.

## 2024-11-25 NOTE — DIETITIAN INITIAL EVALUATION ADULT - ETIOLOGY
2/2 presumed excessive energy intake  Chronic, on O2 3-4L at home  - per son, pt with chronic cough and sputum production  - ABG on admission w/ no signs of acidosis or CO2 retention Chronic, on O2 3-4L at home  - per son, pt with chronic cough and sputum production  - ABG on admission w/ no signs of acidosis or CO2 retention  - per son, pt has been on Prednisone Taper since last hospitalization, but takes prednisone 10mg daily Chronic, on O2 3-4L at home  - per son, pt with chronic cough and sputum production  - ABG on admission w/ no signs of acidosis or CO2 retention  - per son, pt has been on Prednisone Taper since last hospitalization, but takes prednisone 10mg daily  - continue taper as prescribed   - continue home inhalers

## 2024-11-25 NOTE — DIETITIAN INITIAL EVALUATION ADULT - NSFNSADHERENCEPTAFT_GEN_A_CORE
Pt with intentional weight loss. Pt endorses choosing healthier food options, staying hydrated and exercising regularly. Pt cooks most meals at home.

## 2024-11-25 NOTE — PROGRESS NOTE ADULT - ASSESSMENT
Physical Examination:  GENERAL:               Alert, Oriented, No acute distress.    HEENT:                    No JVD, Moist MM  PULM:                     Bilateral air entry, improved aeration to auscultation bilaterally, no significant sputum production, No Rales, No Rhonchi, trace Wheezing  CVS:                         S1, S2,  + Murmur  ABD:                        Soft, nondistended, nontender, normoactive bowel sounds,   EXT:                         mild edema, nontender, No Cyanosis or Clubbing +varicose veinds  NEURO:                  Alert, oriented, interactive, nonfocal, follows commands  PSYC:                      Calm, + Insight.      Assessment    79 yr old man   from home with  recent diagnosed prostate cancer, s/p transurethral focal water vapor ablation on 07/01/24, asthma (controlled no recent hosp or exacerbation), gout, former smoker (quit 1990) and lung nodule, cerebral aneurysm (sx 2019 [place]), PVD, osteoarthritis presenting to ER with cc of SOB for the past couple of weeks.  Since September, he has been having chronic SOB.  For the past week, he has had increased SOB with a productive cough of yellow sputum; he denies fever, chest pain,  Pat admitted for acute ex of asthma due to RSV infection .     Problem List  - Acute bronchospasm in patient with RSV infection  - H/o Asthma with ? acute exacerbation due to RSV  - Former smoker - denies h/o COPD  - h/o abnormal CT/ lung nodules - CT here shows Stable nodules in both lungs as described above when compared to previous exam.   - Prostate CA   - Cerebral aneurysm          Plan  patient on room air today  taper Steroids to Solumedrol 40mg Q12 hrs  Continue nebs  as on trelegy at home continue advair and spiriva while here  Singulair 10 mg QD   Hycodan 5 ml Q8h x 3 days for cough can make prn afterwards  if cultures negative can d/c antibiotics in am  Repeat CT chest in 6 months to monitor lung nodules - d/w Patient    Out pat pulm f/u with Dr. Mason for f/u lung nodules

## 2024-11-25 NOTE — DIETITIAN INITIAL EVALUATION ADULT - PERTINENT MEDS FT
MEDICATIONS  (STANDING):  albuterol/ipratropium for Nebulization 3 milliLiter(s) Nebulizer every 6 hours  allopurinol 100 milliGRAM(s) Oral daily  aspirin enteric coated 81 milliGRAM(s) Oral daily  azithromycin  IVPB 500 milliGRAM(s) IV Intermittent every 24 hours  cefTRIAXone   IVPB 1000 milliGRAM(s) IV Intermittent every 24 hours  enoxaparin Injectable 40 milliGRAM(s) SubCutaneous every 24 hours  fluticasone propionate/ salmeterol 250-50 MICROgram(s) Diskus 1 Dose(s) Inhalation two times a day  hydrocodone/homatropine Syrup 5 milliLiter(s) Oral every 8 hours  influenza  Vaccine (HIGH DOSE) 0.5 milliLiter(s) IntraMuscular once  methylPREDNISolone sodium succinate Injectable 40 milliGRAM(s) IV Push every 8 hours  montelukast 10 milliGRAM(s) Oral daily  pregabalin 75 milliGRAM(s) Oral every 8 hours  tiotropium 2.5 MICROgram(s) Inhaler 2 Puff(s) Inhalation daily    MEDICATIONS  (PRN):  acetaminophen     Tablet .. 650 milliGRAM(s) Oral every 6 hours PRN Temp greater or equal to 38C (100.4F), Mild Pain (1 - 3)  aluminum hydroxide/magnesium hydroxide/simethicone Suspension 30 milliLiter(s) Oral every 4 hours PRN Dyspepsia  melatonin 3 milliGRAM(s) Oral at bedtime PRN Insomnia  ondansetron Injectable 4 milliGRAM(s) IV Push every 8 hours PRN Nausea and/or Vomiting  zolpidem 5 milliGRAM(s) Oral at bedtime PRN Insomnia

## 2024-11-25 NOTE — DIETITIAN INITIAL EVALUATION ADULT - CALCULATED FROM (G/KG)
Call 911 for stroke/Need for follow up after discharge/Prescribed medications/Risk factors for stroke/Stroke education booklet/Stroke support groups for patients, families, and friends/Stroke warning signs and symptoms/Signs and symptoms of stroke
60.16

## 2024-11-26 LAB
ANION GAP SERPL CALC-SCNC: 5 MMOL/L — SIGNIFICANT CHANGE UP (ref 5–17)
BUN SERPL-MCNC: 26 MG/DL — HIGH (ref 7–23)
CALCIUM SERPL-MCNC: 9 MG/DL — SIGNIFICANT CHANGE UP (ref 8.4–10.5)
CHLORIDE SERPL-SCNC: 104 MMOL/L — SIGNIFICANT CHANGE UP (ref 96–108)
CO2 SERPL-SCNC: 32 MMOL/L — HIGH (ref 22–31)
CREAT SERPL-MCNC: 0.83 MG/DL — SIGNIFICANT CHANGE UP (ref 0.5–1.3)
EGFR: 90 ML/MIN/1.73M2 — SIGNIFICANT CHANGE UP
GLUCOSE SERPL-MCNC: 127 MG/DL — HIGH (ref 70–99)
HCT VFR BLD CALC: 41.6 % — SIGNIFICANT CHANGE UP (ref 39–50)
HGB BLD-MCNC: 13.9 G/DL — SIGNIFICANT CHANGE UP (ref 13–17)
MAGNESIUM SERPL-MCNC: 2.4 MG/DL — SIGNIFICANT CHANGE UP (ref 1.6–2.6)
MCHC RBC-ENTMCNC: 30.8 PG — SIGNIFICANT CHANGE UP (ref 27–34)
MCHC RBC-ENTMCNC: 33.4 G/DL — SIGNIFICANT CHANGE UP (ref 32–36)
MCV RBC AUTO: 92 FL — SIGNIFICANT CHANGE UP (ref 80–100)
NRBC # BLD: 0 /100 WBCS — SIGNIFICANT CHANGE UP (ref 0–0)
PHOSPHATE SERPL-MCNC: 3.7 MG/DL — SIGNIFICANT CHANGE UP (ref 2.5–4.5)
PLATELET # BLD AUTO: 205 K/UL — SIGNIFICANT CHANGE UP (ref 150–400)
POTASSIUM SERPL-MCNC: 4.3 MMOL/L — SIGNIFICANT CHANGE UP (ref 3.5–5.3)
POTASSIUM SERPL-SCNC: 4.3 MMOL/L — SIGNIFICANT CHANGE UP (ref 3.5–5.3)
RBC # BLD: 4.52 M/UL — SIGNIFICANT CHANGE UP (ref 4.2–5.8)
RBC # FLD: 13.9 % — SIGNIFICANT CHANGE UP (ref 10.3–14.5)
SODIUM SERPL-SCNC: 141 MMOL/L — SIGNIFICANT CHANGE UP (ref 135–145)
WBC # BLD: 12.27 K/UL — HIGH (ref 3.8–10.5)
WBC # FLD AUTO: 12.27 K/UL — HIGH (ref 3.8–10.5)

## 2024-11-26 PROCEDURE — 99233 SBSQ HOSP IP/OBS HIGH 50: CPT | Mod: GC

## 2024-11-26 RX ADMIN — ENOXAPARIN SODIUM 40 MILLIGRAM(S): 30 INJECTION SUBCUTANEOUS at 06:04

## 2024-11-26 RX ADMIN — PREGABALIN 75 MILLIGRAM(S): 75 CAPSULE ORAL at 15:11

## 2024-11-26 RX ADMIN — Medication 81 MILLIGRAM(S): at 11:31

## 2024-11-26 RX ADMIN — ALLOPURINOL 100 MILLIGRAM(S): 300 TABLET ORAL at 11:32

## 2024-11-26 RX ADMIN — MONTELUKAST SODIUM 10 MILLIGRAM(S): 10 TABLET ORAL at 11:31

## 2024-11-26 RX ADMIN — Medication 40 MILLIGRAM(S): at 18:37

## 2024-11-26 RX ADMIN — PREGABALIN 75 MILLIGRAM(S): 75 CAPSULE ORAL at 06:04

## 2024-11-26 RX ADMIN — PREGABALIN 75 MILLIGRAM(S): 75 CAPSULE ORAL at 21:08

## 2024-11-26 RX ADMIN — FLUTICASONE PROPIONATE AND SALMETEROL XINAFOATE 1 DOSE(S): 45; 21 AEROSOL, METERED RESPIRATORY (INHALATION) at 20:31

## 2024-11-26 RX ADMIN — IPRATROPIUM BROMIDE AND ALBUTEROL SULFATE 3 MILLILITER(S): 2.5; .5 SOLUTION RESPIRATORY (INHALATION) at 20:31

## 2024-11-26 RX ADMIN — Medication 100 MILLIGRAM(S): at 18:37

## 2024-11-26 RX ADMIN — Medication 40 MILLIGRAM(S): at 06:00

## 2024-11-26 RX ADMIN — IPRATROPIUM BROMIDE AND ALBUTEROL SULFATE 3 MILLILITER(S): 2.5; .5 SOLUTION RESPIRATORY (INHALATION) at 02:36

## 2024-11-26 RX ADMIN — IPRATROPIUM BROMIDE AND ALBUTEROL SULFATE 3 MILLILITER(S): 2.5; .5 SOLUTION RESPIRATORY (INHALATION) at 08:59

## 2024-11-26 RX ADMIN — ZOLPIDEM TARTRATE 5 MILLIGRAM(S): 10 TABLET ORAL at 22:24

## 2024-11-26 RX ADMIN — IPRATROPIUM BROMIDE AND ALBUTEROL SULFATE 3 MILLILITER(S): 2.5; .5 SOLUTION RESPIRATORY (INHALATION) at 15:45

## 2024-11-26 NOTE — PROGRESS NOTE ADULT - ASSESSMENT
79 yr old male from home with PMH of recent diagnosed prostate cancer, s/p transurethral focal water vapor ablation on 07/01/24, asthma (controlled no recent hosp or exacerbation), gout, former smoker (quit 1990) and lung nodule, cerebral aneurysm (sx 2019 [place]), PVD, osteoarthritis presenting to ER with cc of SOB for the past couple of weeks.      #SOB 2/2 acute asthma exacerbation likely due to viral PNA   #hx of bilateral lung nodules   - admit to medicine with telemetry and pulse oximetry  - CT chest done 11/6 with stable lung nodule; f/u pulm outpatient for repeat imaging in 6 months (Out pat pulm f/u with Dr. Mason)  - RSV positive   - trop neg  - TTE: lvef: 55 to 60 %.  - DDimer neg, no DVT on LE doppler   - Incentive spirometer, OOB chair  - PT eval: home PT   - pulm rec appreciated:        taper steroids (start prednisone 40mg tomorrow AM and taper 10 mg every 3 days to off)       c/w nebs        c/w advair and spiriva while here (pt on trelegy at home)       c/w singulair 10gm qd       hycodan 5ml Q8h x 3 days for cough       c/w Azithromycin, CTX       check o2 sat on exertion              #Recent prostate cancer, s/p surgery  - F/u outpatient; not on further treatment    #Gout  - C/w allopurinol    #Hx of cerebral neurysm 2019  - Stable    #OA  #Neuropathy  - Lyrica 75mg TID  - LE doppler neg DVT    #Insomnia  - C/w ambien 5mg qhs prn    DVT PPX: Lovenox  Full code  DISP pending  respiratory improvement       Discussed with Dr Daniels

## 2024-11-26 NOTE — PROGRESS NOTE ADULT - ASSESSMENT
Physical Examination:  GENERAL:               Alert, Oriented, No acute distress.    HEENT:                    No JVD, Moist MM  PULM:                     Bilateral air entry, improved aeration to auscultation bilaterally, no significant sputum production, No Rales, No Rhonchi, trace Wheezing end expiratory  CVS:                         S1, S2,  + Murmur  ABD:                        Soft, nondistended, nontender, normoactive bowel sounds,   EXT:                         mild edema, nontender, No Cyanosis or Clubbing +varicose veinds  NEURO:                  Alert, oriented, interactive, nonfocal, follows commands  PSYC:                      Calm, + Insight.      Assessment    79 yr old man   from home with  recent diagnosed prostate cancer, s/p transurethral focal water vapor ablation on 07/01/24, asthma (controlled no recent hosp or exacerbation), gout, former smoker (quit 1990) and lung nodule, cerebral aneurysm (sx 2019 [place]), PVD, osteoarthritis presenting to ER with cc of SOB for the past couple of weeks.  Since September, he has been having chronic SOB.  For the past week, he has had increased SOB with a productive cough of yellow sputum; he denies fever, chest pain,  Pat admitted for acute ex of asthma due to RSV infection .     Problem List  - Acute bronchospasm in patient with RSV infection  - H/o Asthma with ? acute exacerbation due to RSV  - Former smoker - denies h/o COPD  - h/o abnormal CT/ lung nodules - CT here shows Stable nodules in both lungs as described above when compared to previous exam.   - Prostate CA   - Cerebral aneurysm          Plan  patient on room air today  Start prednisone 40mg in am and taper 10 mg every 3 days to off,  Check O2 sat on exertion.   Continue nebs  as on trelegy at home continue advair and spiriva while here  Singulair 10 mg QD   Hycodan 5 ml Q8h x 3 days for cough can make prn afterwards  if cultures negative can d/c antibiotics in am  Repeat CT chest in 6 months to monitor lung nodules - d/w Patient    Out pat pulm f/u with Dr. Mason for f/u lung nodules   Dispo planning can be considered in am  d/w Resident team.

## 2024-11-26 NOTE — PROGRESS NOTE ADULT - ATTENDING COMMENTS
Please see resident note for full details regarding the service.     PE: A+Ox3, no murmurs, diffuse expiratory wheezing bilaterally throughout (improved from yesterday), and soft/NT/ND, no lower extremity swelling     Assessment:  - Acute bronchospasm secondary to RSV   - Asthma exacerbation   - Stable nodules in both lungs   - Leukocytosis steroid induced   - Obesity with BMI 31.8  - History of recent diagnosed prostate cancer, s/p transurethral focal water vapor ablation on 07/01/24, asthma (controlled no recent hosp or exacerbation), gout, former smoker (quit 1990) and lung nodule, cerebral aneurysm (sx 2019 [place]), PVD, osteoarthritis     Plan:   - Continue nebulizers, Advair, Spiriva, Singulair   - On Solumedrol 40 mg Q12H -> still wheezing today, continue with current dose   - Continue Ceftriaxone for 2 more days empirically   - Discontinue Azithromycin today   - Will need repeat CT chest in 6 months for surveillance of lung nodules   - Spoke to pulmonology - Q12H steroids today, 40 mg IVP tomorrow, then taper on discharge. Will get pulse ox on ambulation.   - PT: home PT   - DVT ppx: Lovenox   - Code status: Will confirm   - Dispo: Active. Tapering steroids, likely d/c in 24-48 hours     I spent a total of 50 minutes on the date of this encounter coordinating the patient's care, excluding resident teaching time. This includes reviewing results/imaging and discussions with specialists, nursing, case management/social work. Further tests, medications, and procedures have been ordered as indicated. Results and the plan of care were communicated to the patient and/or their family member. Supporting documentation was completed and added to the patient's chart. Please see resident note for full details regarding the service.     PE: A+Ox3, no murmurs, diffuse expiratory wheezing bilaterally throughout (improved from yesterday), abd soft/NT/ND, no lower extremity swelling     Assessment:  - Acute bronchospasm secondary to RSV   - Asthma exacerbation   - Stable nodules in both lungs   - Leukocytosis steroid induced   - Obesity with BMI 31.8  - History of recent diagnosed prostate cancer, s/p transurethral focal water vapor ablation on 07/01/24, asthma (controlled no recent hosp or exacerbation), gout, former smoker (quit 1990) and lung nodule, cerebral aneurysm (sx 2019 [place]), PVD, osteoarthritis     Plan:   - Continue nebulizers, Advair, Spiriva, Singulair   - On Solumedrol 40 mg Q12H -> still wheezing today, continue with current dose   - Continue Ceftriaxone for 2 more days empirically   - Discontinue Azithromycin today   - Will need repeat CT chest in 6 months for surveillance of lung nodules   - Spoke to pulmonology - Q12H steroids today, 40 mg IVP tomorrow, then taper on discharge. Will get pulse ox on ambulation.   - PT: home PT   - DVT ppx: Lovenox   - Code status: Will confirm   - Dispo: Active. Tapering steroids, likely d/c in 24-48 hours     I spent a total of 50 minutes on the date of this encounter coordinating the patient's care, excluding resident teaching time. This includes reviewing results/imaging and discussions with specialists, nursing, case management/social work. Further tests, medications, and procedures have been ordered as indicated. Results and the plan of care were communicated to the patient and/or their family member. Supporting documentation was completed and added to the patient's chart.

## 2024-11-27 ENCOUNTER — TRANSCRIPTION ENCOUNTER (OUTPATIENT)
Age: 79
End: 2024-11-27

## 2024-11-27 VITALS
OXYGEN SATURATION: 94 % | TEMPERATURE: 97 F | DIASTOLIC BLOOD PRESSURE: 77 MMHG | SYSTOLIC BLOOD PRESSURE: 140 MMHG | HEART RATE: 79 BPM

## 2024-11-27 DIAGNOSIS — M10.9 GOUT, UNSPECIFIED: ICD-10-CM

## 2024-11-27 DIAGNOSIS — C61 MALIGNANT NEOPLASM OF PROSTATE: ICD-10-CM

## 2024-11-27 DIAGNOSIS — G47.00 INSOMNIA, UNSPECIFIED: ICD-10-CM

## 2024-11-27 DIAGNOSIS — M19.90 UNSPECIFIED OSTEOARTHRITIS, UNSPECIFIED SITE: ICD-10-CM

## 2024-11-27 LAB
ALBUMIN SERPL ELPH-MCNC: 3.3 G/DL — SIGNIFICANT CHANGE UP (ref 3.3–5)
ALP SERPL-CCNC: 64 U/L — SIGNIFICANT CHANGE UP (ref 40–120)
ALT FLD-CCNC: 53 U/L — HIGH (ref 10–45)
ANION GAP SERPL CALC-SCNC: 9 MMOL/L — SIGNIFICANT CHANGE UP (ref 5–17)
AST SERPL-CCNC: 28 U/L — SIGNIFICANT CHANGE UP (ref 10–40)
BILIRUB SERPL-MCNC: 0.4 MG/DL — SIGNIFICANT CHANGE UP (ref 0.2–1.2)
BUN SERPL-MCNC: 29 MG/DL — HIGH (ref 7–23)
CALCIUM SERPL-MCNC: 8.9 MG/DL — SIGNIFICANT CHANGE UP (ref 8.4–10.5)
CHLORIDE SERPL-SCNC: 102 MMOL/L — SIGNIFICANT CHANGE UP (ref 96–108)
CO2 SERPL-SCNC: 32 MMOL/L — HIGH (ref 22–31)
CREAT SERPL-MCNC: 1.03 MG/DL — SIGNIFICANT CHANGE UP (ref 0.5–1.3)
EGFR: 75 ML/MIN/1.73M2 — SIGNIFICANT CHANGE UP
GLUCOSE SERPL-MCNC: 118 MG/DL — HIGH (ref 70–99)
HCT VFR BLD CALC: 44.5 % — SIGNIFICANT CHANGE UP (ref 39–50)
HGB BLD-MCNC: 14.7 G/DL — SIGNIFICANT CHANGE UP (ref 13–17)
MCHC RBC-ENTMCNC: 29.9 PG — SIGNIFICANT CHANGE UP (ref 27–34)
MCHC RBC-ENTMCNC: 33 G/DL — SIGNIFICANT CHANGE UP (ref 32–36)
MCV RBC AUTO: 90.6 FL — SIGNIFICANT CHANGE UP (ref 80–100)
NRBC # BLD: 0 /100 WBCS — SIGNIFICANT CHANGE UP (ref 0–0)
PLATELET # BLD AUTO: 242 K/UL — SIGNIFICANT CHANGE UP (ref 150–400)
POTASSIUM SERPL-MCNC: 5.1 MMOL/L — SIGNIFICANT CHANGE UP (ref 3.5–5.3)
POTASSIUM SERPL-SCNC: 5.1 MMOL/L — SIGNIFICANT CHANGE UP (ref 3.5–5.3)
PROT SERPL-MCNC: 6.7 G/DL — SIGNIFICANT CHANGE UP (ref 6–8.3)
RBC # BLD: 4.91 M/UL — SIGNIFICANT CHANGE UP (ref 4.2–5.8)
RBC # FLD: 13.7 % — SIGNIFICANT CHANGE UP (ref 10.3–14.5)
SODIUM SERPL-SCNC: 143 MMOL/L — SIGNIFICANT CHANGE UP (ref 135–145)
WBC # BLD: 13.7 K/UL — HIGH (ref 3.8–10.5)
WBC # FLD AUTO: 13.7 K/UL — HIGH (ref 3.8–10.5)

## 2024-11-27 PROCEDURE — 85027 COMPLETE CBC AUTOMATED: CPT

## 2024-11-27 PROCEDURE — 96374 THER/PROPH/DIAG INJ IV PUSH: CPT

## 2024-11-27 PROCEDURE — 87635 SARS-COV-2 COVID-19 AMP PRB: CPT

## 2024-11-27 PROCEDURE — 87637 SARSCOV2&INF A&B&RSV AMP PRB: CPT

## 2024-11-27 PROCEDURE — 80061 LIPID PANEL: CPT

## 2024-11-27 PROCEDURE — 83880 ASSAY OF NATRIURETIC PEPTIDE: CPT

## 2024-11-27 PROCEDURE — 99285 EMERGENCY DEPT VISIT HI MDM: CPT

## 2024-11-27 PROCEDURE — 80053 COMPREHEN METABOLIC PANEL: CPT

## 2024-11-27 PROCEDURE — 84100 ASSAY OF PHOSPHORUS: CPT

## 2024-11-27 PROCEDURE — 94640 AIRWAY INHALATION TREATMENT: CPT

## 2024-11-27 PROCEDURE — 85379 FIBRIN DEGRADATION QUANT: CPT

## 2024-11-27 PROCEDURE — 93005 ELECTROCARDIOGRAM TRACING: CPT

## 2024-11-27 PROCEDURE — 83735 ASSAY OF MAGNESIUM: CPT

## 2024-11-27 PROCEDURE — 80048 BASIC METABOLIC PNL TOTAL CA: CPT

## 2024-11-27 PROCEDURE — 93306 TTE W/DOPPLER COMPLETE: CPT

## 2024-11-27 PROCEDURE — 99239 HOSP IP/OBS DSCHRG MGMT >30: CPT | Mod: GC

## 2024-11-27 PROCEDURE — 93970 EXTREMITY STUDY: CPT

## 2024-11-27 PROCEDURE — 94760 N-INVAS EAR/PLS OXIMETRY 1: CPT

## 2024-11-27 PROCEDURE — 0241U: CPT

## 2024-11-27 PROCEDURE — 96361 HYDRATE IV INFUSION ADD-ON: CPT

## 2024-11-27 PROCEDURE — 71045 X-RAY EXAM CHEST 1 VIEW: CPT

## 2024-11-27 PROCEDURE — 83036 HEMOGLOBIN GLYCOSYLATED A1C: CPT

## 2024-11-27 PROCEDURE — 36415 COLL VENOUS BLD VENIPUNCTURE: CPT

## 2024-11-27 PROCEDURE — 84484 ASSAY OF TROPONIN QUANT: CPT

## 2024-11-27 PROCEDURE — 97162 PT EVAL MOD COMPLEX 30 MIN: CPT

## 2024-11-27 PROCEDURE — 85025 COMPLETE CBC W/AUTO DIFF WBC: CPT

## 2024-11-27 RX ORDER — PREDNISONE 20 MG/1
1 TABLET ORAL
Qty: 30 | Refills: 0
Start: 2024-11-27

## 2024-11-27 RX ADMIN — ENOXAPARIN SODIUM 40 MILLIGRAM(S): 30 INJECTION SUBCUTANEOUS at 06:21

## 2024-11-27 RX ADMIN — Medication 2 PUFF(S): at 09:13

## 2024-11-27 RX ADMIN — ALLOPURINOL 100 MILLIGRAM(S): 300 TABLET ORAL at 13:14

## 2024-11-27 RX ADMIN — Medication 40 MILLIGRAM(S): at 05:00

## 2024-11-27 RX ADMIN — MONTELUKAST SODIUM 10 MILLIGRAM(S): 10 TABLET ORAL at 13:14

## 2024-11-27 RX ADMIN — FLUTICASONE PROPIONATE AND SALMETEROL XINAFOATE 1 DOSE(S): 45; 21 AEROSOL, METERED RESPIRATORY (INHALATION) at 09:12

## 2024-11-27 RX ADMIN — IPRATROPIUM BROMIDE AND ALBUTEROL SULFATE 3 MILLILITER(S): 2.5; .5 SOLUTION RESPIRATORY (INHALATION) at 09:13

## 2024-11-27 RX ADMIN — PREGABALIN 75 MILLIGRAM(S): 75 CAPSULE ORAL at 05:03

## 2024-11-27 RX ADMIN — Medication 81 MILLIGRAM(S): at 13:14

## 2024-11-27 NOTE — CHART NOTE - NSCHARTNOTEFT_GEN_A_CORE
Please see resident note for full details regarding the service.     PE: A+Ox3, no murmurs, minimal expiratory wheezing throughout the lung fields bilaterally, abd soft/NT/ND, no lower extremity swelling     Assessment:  - Acute bronchospasm secondary to RSV   - Asthma exacerbation   - Stable nodules in both lungs   - Leukocytosis steroid induced   - Obesity with BMI 31.8  - History of recent diagnosed prostate cancer, s/p transurethral focal water vapor ablation on 07/01/24, asthma (controlled no recent hosp or exacerbation), gout, former smoker (quit 1990) and lung nodule, cerebral aneurysm (sx 2019 [place]), PVD, osteoarthritis     Plan:   - Continue nebulizers, Advair, Spiriva, Singulair -> d/c on trelegy   - s/p Solumedrol 40 mg IVP today -> will discharge on Prednisone taper   - Will need repeat CT chest in 6 months for surveillance of lung nodules   - PT: home PT   - DVT ppx: Lovenox   - Dispo: Discharge today     I spent a total of 50 minutes on the date of this encounter coordinating the patient's care, excluding resident teaching time. This includes reviewing results/imaging and discussions with specialists, nursing, case management/social work. Further tests, medications, and procedures have been ordered as indicated. Results and the plan of care were communicated to the patient and/or their family member. Supporting documentation was completed and added to the patient's chart.
Spoke with patient's son, Celso, to give him updates. All questions and concerns were answered.

## 2024-11-27 NOTE — DISCHARGE NOTE NURSING/CASE MANAGEMENT/SOCIAL WORK - PATIENT PORTAL LINK FT
You can access the FollowMyHealth Patient Portal offered by [facility] by registering at the following website: [email or web address]. By joining [employer]’s FollowMyHealth portal, you will also be able to view your health information using other applications (apps) compatible with our system.

## 2024-11-27 NOTE — DISCHARGE NOTE PROVIDER - NSDCFUSCHEDAPPT_GEN_ALL_CORE_FT
[facility]  MRI  L[facility]  Scheduled Appointment: 12/16/2024    [facility]  OTOLARYNG [address] [facility]   Scheduled Appointment: 12/18/2024    [facility]  UROLOGY [address] [facility]  Scheduled Appointment: 12/19/2024    Oswadlo Jones  [facility]  UROLOGY [address]  Scheduled Appointment: 12/19/2024    Silva Rodriguez  [facility]  PULMMED [address] [facility] [address]  Scheduled Appointment: 01/03/2025     Afsaneh Blake  [facility] Physician [facility]  FAMILYMED [address] [facility]  Scheduled Appointment: 12/03/2024    [facility]  MRI  [facility]  Scheduled Appointment: 12/16/2024    [facility]  OTOLARYNG [address] [place] [address]   Scheduled Appointment: 12/18/2024    [facility]  UROLOGY [address] [facility]  Scheduled Appointment: 12/19/2024    Oswaldo Jones  [facility]  UROLOGY [address] [place] [address] R  Scheduled Appointment: 12/19/2024    Silva Rodriguez  [facility]  PULMMED [address] [facility] [address]  Scheduled Appointment: 01/03/2025

## 2024-11-27 NOTE — DISCHARGE NOTE PROVIDER - HOSPITAL COURSE
HPI: 79 yr old male from home with PMH of recent diagnosed prostate cancer, s/p transurethral focal water vapor ablation on 07/01/24, asthma (controlled no recent hosp or exacerbation), gout, former smoker (quit 1990) and lung nodule, cerebral aneurysm (sx 2019 [place]), PVD, osteoarthritis presenting to ER with cc of SOB for the past couple of weeks.  Since September, he has been having chronic SOB.  He went to see his pulmonologist Dr Nash who diagnosed him with a viral infection in Sept; she followed up with him last week and noted that he had increased wheezing.  She placed him on azithromycin and prednisone which he completed.  He also performed a CT chest on 11/6 to follow up on lung nodules; he was told they were stable.    For the past week, he has had increased SOB with a productive cough of yellow sputum; he denies fever, chest pain, N/V, abd pain, travel hx, sick contacts.  He checked his pulse ox at home and noted it to be between 90-92%.  His SOB has progressively worsened and he decided to come to the ER.    In the ER, he was given CTX, azithromycin and albuterol.      Patient was admitted for asthma exacerbation. The patient was started on IV Solumedrol, Duoneb, Advair, Spiriva, hycodan, and singulair.     ---  VITALS:   Vital Signs Last 24 Hrs  T(F): 97.2 (27 Nov 2024 05:37), Max: 98.4 (26 Nov 2024 13:39)  HR: 65 (27 Nov 2024 05:37) (65 - 94)  BP: 163/94 (27 Nov 2024 05:37) (142/82 - 163/94)  RR: 18 (27 Nov 2024 05:37) (16 - 18)  SpO2: 95% (27 Nov 2024 05:37) (93% - 98%)  ---  PHYSICAL EXAM:   General: Awake and alert, cooperative with exam. No acute distress.   Cardiology: Normal S1, S2. No murmurs. Regular rate and rhythm.   Respiratory: Lungs clear to ascultation bilaterally. No wheezes, rales, or rhonchi.   Gastrointestinal: Positive bowel sounds. Soft. Non-tender. Non-distended. No guarding, rigidity, or rebound tenderness.  Extremities: No peripheral edema bilaterally.  Neurological: A+Ox3. CN 2-12 intact. No focal neurological deficits. Normal speech. No facial droop.  ---    Indicate ongoing risks or concerns:    30 Day Supply through Meds to Beds: Completed or not. If not, please provide reason why.     GOC:   • Code Status   • Summary of Goals of Care Conversation/ what matters most    Source of Infection:  Antibiotic / Last Day:    Discharging Provider:  Gwendolyn Daniels MD   Contact Info: [phone]    Outpatient Provider: Sign out given?  SNF Provider: Sign-out given?    PLEASE COPY AND PASTE INTO CARE PLAN USING CTRL V  You came to the hospital due to:   You were diagnosed with:   You were treated with:   You were prescribed the following new medications:    You will need to follow up with your primary care physician for further management.    Discharging Provider:  Gwendolyn Daniels MD  Contact Info: [phone] - Please call with any questions or concerns.          HPI: 79 yr old male from home with PMH of recent diagnosed prostate cancer, s/p transurethral focal water vapor ablation on 07/01/24, asthma (controlled no recent hosp or exacerbation), gout, former smoker (quit 1990) and lung nodule, cerebral aneurysm (sx 2019 [place]), PVD, osteoarthritis presenting to ER with cc of SOB for the past couple of weeks.  Since September, he has been having chronic SOB.  He went to see his pulmonologist Dr Nash who diagnosed him with a viral infection in Sept; she followed up with him last week and noted that he had increased wheezing.  She placed him on azithromycin and prednisone which he completed.  He also performed a CT chest on 11/6 to follow up on lung nodules; he was told they were stable.    For the past week, he has had increased SOB with a productive cough of yellow sputum; he denies fever, chest pain, N/V, abd pain, travel hx, sick contacts.  He checked his pulse ox at home and noted it to be between 90-92%.  His SOB has progressively worsened and he decided to come to the ER.    In the ER, he was given CTX, azithromycin and albuterol.      Patient was admitted for asthma exacerbation. The patient was started on IV Solumedrol, Duoneb, Advair, Spiriva, hycodan, and Singulair. Patient tested positive for RSV. Patient's steroids were tapered as his symptoms improved. Patient was wheezing less on exam. Patient tolerated ambulation without getting short of breath. PT saw him and recommended resuming his services of home PT. Patient was transitioned to oral 40mg prednisone, and leaving on a 10mg taper every 3 days.     On day of discharge, patient is hemodynamically stable. Patient was seen and examined by me this morning. No overnight events. Patient has no complaints at this time. Patient states he would like to go home today.      ---  VITALS:   Vital Signs Last 24 Hrs  T(F): 97.2 (27 Nov 2024 05:37), Max: 98.4 (26 Nov 2024 13:39)  HR: 65 (27 Nov 2024 05:37) (65 - 94)  BP: 163/94 (27 Nov 2024 05:37) (142/82 - 163/94)  RR: 18 (27 Nov 2024 05:37) (16 - 18)  SpO2: 95% (27 Nov 2024 05:37) (93% - 98%)  ---  PHYSICAL EXAM:   General: Awake and alert, cooperative with exam. No acute distress.   Cardiology: Normal S1, S2. No murmurs. Regular rate and rhythm.   Respiratory: Lungs clear to ascultation bilaterally. No wheezes, rales, or rhonchi.   Gastrointestinal: Positive bowel sounds. Soft. Non-tender. Non-distended. No guarding, rigidity, or rebound tenderness.  Extremities: No peripheral edema bilaterally.  Neurological: A+Ox3. CN 2-12 intact. No focal neurological deficits. Normal speech. No facial droop.  ---      30 Day Supply through Meds to Beds: Patient will pick them up from pharmacy.      GOC:   • Code Status: Full code.       Source of Infection: RSV   Antibiotic / Last Day: Prednisone taper.  Last day: 12/9/24    Discharging Provider:  Maria Fernanda Ordoñez DO   Contact Info: [phone]    Outpatient Provider: Sign out given?  [facility] Provider: Sign-out given?    PLEASE COPY AND PASTE INTO CARE PLAN USING CTRL V  You came to the hospital due to:   You were diagnosed with:   You were treated with:   You were prescribed the following new medications:            HPI: 79 yr old male from home with PMH of recent diagnosed prostate cancer, s/p transurethral focal water vapor ablation on 07/01/24, asthma (controlled no recent hosp or exacerbation), gout, former smoker (quit 1990) and lung nodule, cerebral aneurysm (sx 2019 [place]), PVD, osteoarthritis presenting to ER with cc of SOB for the past couple of weeks.  Since September, he has been having chronic SOB.  He went to see his pulmonologist Dr Nash who diagnosed him with a viral infection in Sept; she followed up with him last week and noted that he had increased wheezing.  She placed him on azithromycin and prednisone which he completed.  He also performed a CT chest on 11/6 to follow up on lung nodules; he was told they were stable.  For the past week, he has had increased SOB with a productive cough of yellow sputum; he denies fever, chest pain, N/V, abd pain, travel hx, sick contacts.  He checked his pulse ox at home and noted it to be between 90-92%.  His SOB has progressively worsened and he decided to come to the ER.  In the ER, he was given CTX, azithromycin and albuterol.  Patient was admitted for asthma exacerbation. The patient was started on IV Solumedrol, Duoneb, Advair, Spiriva, hycodan, and Singulair. Patient tested positive for RSV. Patient's steroids were tapered as his symptoms improved. Patient was wheezing less on exam. Patient tolerated ambulation without getting short of breath. PT saw him and recommended resuming his services of home PT. Patient was transitioned to oral 40mg prednisone, and leaving on a 10mg taper every 3 days.   On day of discharge, patient is hemodynamically stable. Patient was seen and examined by me this morning. No overnight events. Patient has no complaints at this time. Patient states he would like to go home today.      ---  VITALS:   Vital Signs Last 24 Hrs  T(F): 97.2 (27 Nov 2024 05:37), Max: 98.4 (26 Nov 2024 13:39)  HR: 65 (27 Nov 2024 05:37) (65 - 94)  BP: 163/94 (27 Nov 2024 05:37) (142/82 - 163/94)  RR: 18 (27 Nov 2024 05:37) (16 - 18)  SpO2: 95% (27 Nov 2024 05:37) (93% - 98%)  ---  PHYSICAL EXAM:   General: Awake and alert, cooperative with exam. No acute distress.   Cardiology: Normal S1, S2. No murmurs. Regular rate and rhythm.   Respiratory: Lungs clear to ascultation bilaterally. No wheezes, rales, or rhonchi.   Gastrointestinal: Positive bowel sounds. Soft. Non-tender. Non-distended. No guarding, rigidity, or rebound tenderness.  Extremities: No peripheral edema bilaterally.  Neurological: A+Ox3. CN 2-12 intact. No focal neurological deficits. Normal speech. No facial droop.  ---  30 Day Supply through Meds to Beds: Patient will pick them up from pharmacy.    ---  C:   • Code Status: Full code.     ---  Source of Infection: RSV   Antibiotic / Last Day: Prednisone taper.  Last day: 12/9/24    Discharging Provider:  Maria Fernanda Ordoñez DO   Contact Info: [phone]    Outpatient Provider: Dr Antoine notified on discharge

## 2024-11-27 NOTE — DISCHARGE NOTE PROVIDER - CARE PROVIDER_API CALL
[facility] [address] [place] [address]  Phone: [phone]  Fax: [phone]  Established Patient  Follow Up Time: 1 week

## 2024-11-27 NOTE — PROGRESS NOTE ADULT - SUBJECTIVE AND OBJECTIVE BOX
Patient is a 79y old  Male who presents with a chief complaint of Shortness of breath (27 Nov 2024 09:21)      INTERVAL HPI/OVERNIGHT EVENTS: Patient seen and examined at bedside. No overnight events occurred. Patient has no complaints at this time. Denies fevers, chills, headache, lightheadedness, chest pain, dyspnea, abdominal pain, n/v/d/c.    MEDICATIONS  (STANDING):  albuterol/ipratropium for Nebulization 3 milliLiter(s) Nebulizer every 6 hours  allopurinol 100 milliGRAM(s) Oral daily  aspirin enteric coated 81 milliGRAM(s) Oral daily  cefTRIAXone   IVPB 1000 milliGRAM(s) IV Intermittent every 24 hours  enoxaparin Injectable 40 milliGRAM(s) SubCutaneous every 24 hours  fluticasone propionate/ salmeterol 250-50 MICROgram(s) Diskus 1 Dose(s) Inhalation two times a day  hydrocodone/homatropine Syrup 5 milliLiter(s) Oral every 8 hours  influenza  Vaccine (HIGH DOSE) 0.5 milliLiter(s) IntraMuscular once  methylPREDNISolone sodium succinate Injectable 40 milliGRAM(s) IV Push every 12 hours  montelukast 10 milliGRAM(s) Oral daily  pregabalin 75 milliGRAM(s) Oral every 8 hours  tiotropium 2.5 MICROgram(s) Inhaler 2 Puff(s) Inhalation daily    MEDICATIONS  (PRN):  acetaminophen     Tablet .. 650 milliGRAM(s) Oral every 6 hours PRN Temp greater or equal to 38C (100.4F), Mild Pain (1 - 3)  aluminum hydroxide/magnesium hydroxide/simethicone Suspension 30 milliLiter(s) Oral every 4 hours PRN Dyspepsia  melatonin 3 milliGRAM(s) Oral at bedtime PRN Insomnia  ondansetron Injectable 4 milliGRAM(s) IV Push every 8 hours PRN Nausea and/or Vomiting  zolpidem 5 milliGRAM(s) Oral at bedtime PRN Insomnia      Allergies    No Known Allergies    Intolerances        REVIEW OF SYSTEMS:  CONSTITUTIONAL: No fever or chills  HEENT:  No headache, no sore throat  RESPIRATORY: No cough, wheezing, or shortness of breath  CARDIOVASCULAR: No chest pain, palpitations  GASTROINTESTINAL: No abd pain, nausea, vomiting, or diarrhea  GENITOURINARY: No dysuria, frequency, or hematuria  NEUROLOGICAL: no focal weakness or dizziness  MUSCULOSKELETAL: no myalgias     Vital Signs Last 24 Hrs  T(C): 36.2 (27 Nov 2024 13:03), Max: 36.6 (26 Nov 2024 20:05)  T(F): 97.2 (27 Nov 2024 13:03), Max: 97.8 (26 Nov 2024 20:05)  HR: 79 (27 Nov 2024 13:03) (65 - 79)  BP: 140/77 (27 Nov 2024 13:03) (140/77 - 163/94)  BP(mean): 117 (27 Nov 2024 05:37) (117 - 117)  RR: 18 (27 Nov 2024 05:37) (17 - 18)  SpO2: 94% (27 Nov 2024 13:03) (94% - 98%)    Parameters below as of 27 Nov 2024 09:13  Patient On (Oxygen Delivery Method): room air        PHYSICAL EXAM:  GENERAL: NAD  HEENT:  anicteric, moist mucous membranes  CHEST/LUNG:  +expiratory wheezes b/l,  no rales, or rhonchi  HEART:  RRR, S1, S2  ABDOMEN:  BS+, soft, nontender, nondistended  EXTREMITIES: no edema, cyanosis, or calf tenderness  NERVOUS SYSTEM: AOx3, no slurred speech, no focal deficits, answers questions and follows commands appropriately    LABS:                        14.7   13.70 )-----------( 242      ( 27 Nov 2024 06:05 )             44.5     CBC Full  -  ( 27 Nov 2024 06:05 )  WBC Count : 13.70 K/uL  Hemoglobin : 14.7 g/dL  Hematocrit : 44.5 %  Platelet Count - Automated : 242 K/uL  Mean Cell Volume : 90.6 fl  Mean Cell Hemoglobin : 29.9 pg  Mean Cell Hemoglobin Concentration : 33.0 g/dL  Auto Neutrophil # : x  Auto Lymphocyte # : x  Auto Monocyte # : x  Auto Eosinophil # : x  Auto Basophil # : x  Auto Neutrophil % : x  Auto Lymphocyte % : x  Auto Monocyte % : x  Auto Eosinophil % : x  Auto Basophil % : x    27 Nov 2024 06:05    143    |  102    |  29     ----------------------------<  118    5.1     |  32     |  1.03     Ca    8.9        27 Nov 2024 06:05    TPro  6.7    /  Alb  3.3    /  TBili  0.4    /  DBili  x      /  AST  28     /  ALT  53     /  AlkPhos  64     27 Nov 2024 06:05      Urinalysis Basic - ( 27 Nov 2024 06:05 )    Color: x / Appearance: x / SG: x / pH: x  Gluc: 118 mg/dL / Ketone: x  / Bili: x / Urobili: x   Blood: x / Protein: x / Nitrite: x   Leuk Esterase: x / RBC: x / WBC x   Sq Epi: x / Non Sq Epi: x / Bacteria: x      CAPILLARY BLOOD GLUCOSE              RADIOLOGY & ADDITIONAL TESTS:        Consultant(s) Notes Reviewed:  [x] YES  [ ] NO    
Follow-up Pulmonary Progress Note  Chief Complaint : Asthma with acute exacerbation      patient seen and examined  states less sob, wheezing  cough resolved,  on room air  patient states episode of MARSH.       Allergies :No Known Allergies      PAST MEDICAL & SURGICAL HISTORY:  Neuropathy due to peripheral vascular disease  Prostate cancer  Asthma  Gout  History of cerebral aneurysm  Osteoarthritis  H/O left wrist surgery  History of brain surgery  H/O total knee replacement, left  History of ankle fusion        Medications:  MEDICATIONS  (STANDING):  albuterol/ipratropium for Nebulization 3 milliLiter(s) Nebulizer every 6 hours  allopurinol 100 milliGRAM(s) Oral daily  aspirin enteric coated 81 milliGRAM(s) Oral daily  cefTRIAXone   IVPB 1000 milliGRAM(s) IV Intermittent every 24 hours  enoxaparin Injectable 40 milliGRAM(s) SubCutaneous every 24 hours  fluticasone propionate/ salmeterol 250-50 MICROgram(s) Diskus 1 Dose(s) Inhalation two times a day  hydrocodone/homatropine Syrup 5 milliLiter(s) Oral every 8 hours  influenza  Vaccine (HIGH DOSE) 0.5 milliLiter(s) IntraMuscular once  methylPREDNISolone sodium succinate Injectable 40 milliGRAM(s) IV Push every 12 hours  montelukast 10 milliGRAM(s) Oral daily  pregabalin 75 milliGRAM(s) Oral every 8 hours  tiotropium 2.5 MICROgram(s) Inhaler 2 Puff(s) Inhalation daily    MEDICATIONS  (PRN):  acetaminophen     Tablet .. 650 milliGRAM(s) Oral every 6 hours PRN Temp greater or equal to 38C (100.4F), Mild Pain (1 - 3)  aluminum hydroxide/magnesium hydroxide/simethicone Suspension 30 milliLiter(s) Oral every 4 hours PRN Dyspepsia  melatonin 3 milliGRAM(s) Oral at bedtime PRN Insomnia  ondansetron Injectable 4 milliGRAM(s) IV Push every 8 hours PRN Nausea and/or Vomiting  zolpidem 5 milliGRAM(s) Oral at bedtime PRN Insomnia      Antibiotics History  azithromycin  IVPB 500 milliGRAM(s) IV Intermittent once, 11-23-24 @ 17:22, Stop order after: 1 Doses  azithromycin  IVPB 500 milliGRAM(s) IV Intermittent every 24 hours, 11-24-24 @ 00:00  cefTRIAXone   IVPB 1000 milliGRAM(s) IV Intermittent every 24 hours, 11-24-24 @ 00:00, Stop order after: 5 Days  cefTRIAXone   IVPB 1000 milliGRAM(s) IV Intermittent once, 11-23-24 @ 17:22, Stop order after: 1 Doses      Heme Medications   aspirin enteric coated 81 milliGRAM(s) Oral daily, 11-23-24 @ 17:33  enoxaparin Injectable 40 milliGRAM(s) SubCutaneous every 24 hours, 11-23-24 @ 17:34      GI Medications  aluminum hydroxide/magnesium hydroxide/simethicone Suspension 30 milliLiter(s) Oral every 4 hours, 11-23-24 @ 17:25, Routine PRN        LABS:                        13.9   12.27 )-----------( 205      ( 26 Nov 2024 06:44 )             41.6     11-26    141  |  104  |  26[H]  ----------------------------<  127[H]  4.3   |  32[H]  |  0.83    Ca    9.0      26 Nov 2024 06:44  Phos  3.7     11-26  Mg     2.4     11-26      HIT ab -- 11-23 @ 16:00  HIT ab EIA --  D Dimer -213           VITALS:  T(C): 36.3 (11-26-24 @ 05:08), Max: 36.9 (11-25-24 @ 20:48)  T(F): 97.4 (11-26-24 @ 05:08), Max: 98.5 (11-25-24 @ 20:48)  HR: 87 (11-26-24 @ 05:08) (70 - 87)  BP: 136/84 (11-26-24 @ 05:08) (136/84 - 157/88)  BP(mean): --  ABP: --  ABP(mean): --  RR: 18 (11-26-24 @ 05:08) (17 - 18)  SpO2: 95% (11-26-24 @ 05:08) (93% - 95%)  CVP(mm Hg): --  CVP(cm H2O): --    Ins and Outs     11-25-24 @ 07:01  -  11-26-24 @ 07:00  --------------------------------------------------------  IN: 200 mL / OUT: 0 mL / NET: 200 mL        Height (cm): 177.8 (11-23-24 @ 19:54)  Weight (kg): 100.4 (11-23-24 @ 19:54)  BMI (kg/m2): 31.8 (11-23-24 @ 19:54)        I&O's Detail    25 Nov 2024 07:01  -  26 Nov 2024 07:00  --------------------------------------------------------  IN:    Oral Fluid: 200 mL  Total IN: 200 mL    OUT:  Total OUT: 0 mL    Total NET: 200 mL       
Patient is a 79y old  Male who presents with a chief complaint of Asthma with acute exacerbation     (25 Nov 2024 13:06)      INTERVAL HPI/OVERNIGHT EVENTS: Patient seen and examined at bedside. No overnight events occurred. Patient has no complaints at this time. Denies fevers, chills, headache, lightheadedness, chest pain, dyspnea, abdominal pain, n/v/d/c.    MEDICATIONS  (STANDING):  albuterol/ipratropium for Nebulization 3 milliLiter(s) Nebulizer every 6 hours  allopurinol 100 milliGRAM(s) Oral daily  aspirin enteric coated 81 milliGRAM(s) Oral daily  azithromycin  IVPB 500 milliGRAM(s) IV Intermittent every 24 hours  cefTRIAXone   IVPB 1000 milliGRAM(s) IV Intermittent every 24 hours  enoxaparin Injectable 40 milliGRAM(s) SubCutaneous every 24 hours  fluticasone propionate/ salmeterol 250-50 MICROgram(s) Diskus 1 Dose(s) Inhalation two times a day  hydrocodone/homatropine Syrup 5 milliLiter(s) Oral every 8 hours  influenza  Vaccine (HIGH DOSE) 0.5 milliLiter(s) IntraMuscular once  methylPREDNISolone sodium succinate Injectable 40 milliGRAM(s) IV Push every 8 hours  montelukast 10 milliGRAM(s) Oral daily  pregabalin 75 milliGRAM(s) Oral every 8 hours  tiotropium 2.5 MICROgram(s) Inhaler 2 Puff(s) Inhalation daily    MEDICATIONS  (PRN):  acetaminophen     Tablet .. 650 milliGRAM(s) Oral every 6 hours PRN Temp greater or equal to 38C (100.4F), Mild Pain (1 - 3)  aluminum hydroxide/magnesium hydroxide/simethicone Suspension 30 milliLiter(s) Oral every 4 hours PRN Dyspepsia  melatonin 3 milliGRAM(s) Oral at bedtime PRN Insomnia  ondansetron Injectable 4 milliGRAM(s) IV Push every 8 hours PRN Nausea and/or Vomiting  zolpidem 5 milliGRAM(s) Oral at bedtime PRN Insomnia      Allergies    No Known Allergies    Intolerances        REVIEW OF SYSTEMS:  CONSTITUTIONAL: No fever or chills  HEENT:  No headache, no sore throat  RESPIRATORY: No cough, wheezing, or shortness of breath  CARDIOVASCULAR: No chest pain, palpitations  GASTROINTESTINAL: No abd pain, nausea, vomiting, or diarrhea  GENITOURINARY: No dysuria, frequency, or hematuria  NEUROLOGICAL: no focal weakness or dizziness  MUSCULOSKELETAL: no myalgias     Vital Signs Last 24 Hrs  T(C): 36.6 (25 Nov 2024 04:51), Max: 36.8 (24 Nov 2024 20:52)  T(F): 97.9 (25 Nov 2024 04:51), Max: 98.3 (24 Nov 2024 20:52)  HR: 73 (25 Nov 2024 15:05) (63 - 75)  BP: 158/80 (25 Nov 2024 04:51) (151/74 - 158/80)  BP(mean): --  RR: 17 (25 Nov 2024 04:51) (16 - 17)  SpO2: 93% (25 Nov 2024 15:05) (93% - 95%)    Parameters below as of 25 Nov 2024 15:05  Patient On (Oxygen Delivery Method): room air        PHYSICAL EXAM:  GENERAL: NAD  HEENT:  anicteric, moist mucous membranes  CHEST/LUNG:  +expiratory wheezes b/l, no rales or rhonchi  HEART:  RRR, S1, S2  ABDOMEN:  BS+, soft, nontender, nondistended  EXTREMITIES: no edema, cyanosis, or calf tenderness  NERVOUS SYSTEM: answers questions and follows commands appropriately    LABS:                        13.3   11.22 )-----------( 199      ( 25 Nov 2024 06:08 )             39.7     CBC Full  -  ( 25 Nov 2024 06:08 )  WBC Count : 11.22 K/uL  Hemoglobin : 13.3 g/dL  Hematocrit : 39.7 %  Platelet Count - Automated : 199 K/uL  Mean Cell Volume : 90.2 fl  Mean Cell Hemoglobin : 30.2 pg  Mean Cell Hemoglobin Concentration : 33.5 g/dL  Auto Neutrophil # : x  Auto Lymphocyte # : x  Auto Monocyte # : x  Auto Eosinophil # : x  Auto Basophil # : x  Auto Neutrophil % : x  Auto Lymphocyte % : x  Auto Monocyte % : x  Auto Eosinophil % : x  Auto Basophil % : x    25 Nov 2024 06:08    140    |  105    |  24     ----------------------------<  139    4.5     |  29     |  0.98     Ca    8.9        25 Nov 2024 06:08        Urinalysis Basic - ( 25 Nov 2024 06:08 )    Color: x / Appearance: x / SG: x / pH: x  Gluc: 139 mg/dL / Ketone: x  / Bili: x / Urobili: x   Blood: x / Protein: x / Nitrite: x   Leuk Esterase: x / RBC: x / WBC x   Sq Epi: x / Non Sq Epi: x / Bacteria: x      CAPILLARY BLOOD GLUCOSE              RADIOLOGY & ADDITIONAL TESTS:      Consultant(s) Notes Reviewed:  [x] YES  [ ] NO    
Follow-up Pulmonary Progress Note  Chief Complaint : Asthma with acute exacerbation      pt on room air today  feeling less sob  no cough  mild wheezing        Allergies :No Known Allergies      PAST MEDICAL & SURGICAL HISTORY:  Neuropathy due to peripheral vascular disease  Prostate cancer  Asthma  Gout  History of cerebral aneurysm  Osteoarthritis  H/O left wrist surgery  History of brain surgery  H/O total knee replacement, left  History of ankle fusion        Medications:  MEDICATIONS  (STANDING):  albuterol/ipratropium for Nebulization 3 milliLiter(s) Nebulizer every 6 hours  allopurinol 100 milliGRAM(s) Oral daily  aspirin enteric coated 81 milliGRAM(s) Oral daily  azithromycin  IVPB 500 milliGRAM(s) IV Intermittent every 24 hours  cefTRIAXone   IVPB 1000 milliGRAM(s) IV Intermittent every 24 hours  enoxaparin Injectable 40 milliGRAM(s) SubCutaneous every 24 hours  fluticasone propionate/ salmeterol 250-50 MICROgram(s) Diskus 1 Dose(s) Inhalation two times a day  hydrocodone/homatropine Syrup 5 milliLiter(s) Oral every 8 hours  influenza  Vaccine (HIGH DOSE) 0.5 milliLiter(s) IntraMuscular once  methylPREDNISolone sodium succinate Injectable 40 milliGRAM(s) IV Push every 8 hours  montelukast 10 milliGRAM(s) Oral daily  pregabalin 75 milliGRAM(s) Oral every 8 hours  tiotropium 2.5 MICROgram(s) Inhaler 2 Puff(s) Inhalation daily    MEDICATIONS  (PRN):  acetaminophen     Tablet .. 650 milliGRAM(s) Oral every 6 hours PRN Temp greater or equal to 38C (100.4F), Mild Pain (1 - 3)  aluminum hydroxide/magnesium hydroxide/simethicone Suspension 30 milliLiter(s) Oral every 4 hours PRN Dyspepsia  melatonin 3 milliGRAM(s) Oral at bedtime PRN Insomnia  ondansetron Injectable 4 milliGRAM(s) IV Push every 8 hours PRN Nausea and/or Vomiting  zolpidem 5 milliGRAM(s) Oral at bedtime PRN Insomnia      Antibiotics History  azithromycin  IVPB 500 milliGRAM(s) IV Intermittent once, 11-23-24 @ 17:22, Stop order after: 1 Doses  azithromycin  IVPB 500 milliGRAM(s) IV Intermittent every 24 hours, 11-24-24 @ 00:00  cefTRIAXone   IVPB 1000 milliGRAM(s) IV Intermittent every 24 hours, 11-24-24 @ 00:00, Stop order after: 5 Days  cefTRIAXone   IVPB 1000 milliGRAM(s) IV Intermittent once, 11-23-24 @ 17:22, Stop order after: 1 Doses      Heme Medications   aspirin enteric coated 81 milliGRAM(s) Oral daily, 11-23-24 @ 17:33  enoxaparin Injectable 40 milliGRAM(s) SubCutaneous every 24 hours, 11-23-24 @ 17:34      GI Medications  aluminum hydroxide/magnesium hydroxide/simethicone Suspension 30 milliLiter(s) Oral every 4 hours, 11-23-24 @ 17:25, Routine PRN        LABS:                        13.2   7.42  )-----------( 186      ( 24 Nov 2024 06:02 )             39.9     11-24    139  |  105  |  19  ----------------------------<  151[H]  4.6   |  28  |  0.91    Ca    8.8      24 Nov 2024 06:02  Mg     2.1     11-24    TPro  6.5  /  Alb  3.3  /  TBili  0.4  /  DBili  x   /  AST  27  /  ALT  24  /  AlkPhos  68  11-23    HIT ab -- 11-23 @ 16:00  HIT ab EIA --  D Dimer -213     Resp Syn Virus Result: Detected (11.23.24 @ 16:00)       RADIOLOGY  CXR:    < from: Xray Chest 1 View- PORTABLE-Urgent (11.23.24 @ 16:11) >    ACC: [id number] EXAM:  XR CHEST PORTABLE URGENT 1V   ORDERED BY:  MEHNAZ TYSON     PROCEDURE DATE:  11/23/2024          INTERPRETATION:  TECHNIQUE: Single portable view of the chest.    COMPARISON:  8/21/2024    CLINICAL HISTORY: Shortness of Breath    FINDINGS:    Single frontal view of the chest demonstrates bilateral lower lobe   infiltrates/atelectasis. The cardiomediastinal silhouette is enlarged. No   acute osseous abnormalities. Overlying EKG leads and wires are noted    IMPRESSION: Bilateral lower lobe infiltrates/atelectasis. Consider noncontrast chest CT.    --- End of Report ---      < end of copied text >    CT:  < from: CT Chest No Cont (11.06.24 @ 10:24) >    INTERPRETATION:  Clinical information: Follow-up examination. Exam is   compared to previous study of 6/4/2024.    CT scan of the chest was obtained without administration of intravenous   contrast.    Few small lymph nodes are present in the pretracheal space and AP window.    Heart is normal in size. Calcification of the coronary arteries is noted.   No pericardial effusion is noted.    No endobronchial lesions are noted. Tubular shaped opacity is once again   noted in the left lower lobe. It is unchanged when compared to previous   exam. 0.5 cm groundglass nodule in the left lower lobe, ill-defined   opacity containing lucency in the superior segment of the right lower   lobe as well as 0.5 cm groundglass nodule in the apical segment of the   right upper lobe are noted. They are unchanged when compared to previous   exam. No pleural effusions are noted.    Below the diaphragm, visualized portions of the abdomen demonstrate   low-attenuation lesions in the liver and right kidney which are unchanged   when compared to previous exam.    Degenerative changes of the spine are noted.    IMPRESSION: Stable nodules in both lungs as described above when compared   to previous exam. Continued follow-up examination is recommended to   ensure stability.    --- End of Report ---        < end of copied text >    ECHO:    US  < from: US Duplex Venous Lower Ext Complete, Bilateral (11.24.24 @ 11:09) >  IMPRESSION:  No evidence of deep venous thrombosis in either lower extremity.        < end of copied text >    VITALS:  T(C): 36.7 (11-24-24 @ 13:36), Max: 36.8 (11-23-24 @ 18:41)  T(F): 98 (11-24-24 @ 13:36), Max: 98.2 (11-23-24 @ 18:41)  HR: 73 (11-24-24 @ 13:36) (68 - 96)  BP: 129/76 (11-24-24 @ 13:36) (118/75 - 160/83)  BP(mean): --  ABP: --  ABP(mean): --  RR: 16 (11-24-24 @ 13:36) (15 - 18)  SpO2: 93% (11-24-24 @ 13:36) (93% - 96%)  CVP(mm Hg): --  CVP(cm H2O): --    Ins and Outs       Height (cm): 177.8 (11-23-24 @ 19:54)  Weight (kg): 100.4 (11-23-24 @ 19:54)  BMI (kg/m2): 31.8 (11-23-24 @ 19:54)        I&O's Detail      Physical Examination:  GENERAL:               Alert, Oriented, No acute distress.    HEENT:                    No JVD, Moist MM  PULM:                     Bilateral air entry, dimminished to auscultation bilaterally, no significant sputum production, No Rales, No Rhonchi, trace Wheezing  CVS:                         S1, S2,  + Murmur  ABD:                        Soft, nondistended, nontender, normoactive bowel sounds,   EXT:                         mild edema, nontender, No Cyanosis or Clubbing +varicose veinds  NEURO:                  Alert, oriented, interactive, nonfocal, follows commands  PSYC:                      Calm, + Insight.            
Follow-up Pulmonary Progress Note  Chief Complaint : Asthma with acute exacerbation    patient seen and examined  comfortable  less wheezing, less cough, less sob.       Allergies :No Known Allergies      PAST MEDICAL & SURGICAL HISTORY:  Neuropathy due to peripheral vascular disease    Prostate cancer    Asthma    Gout    History of cerebral aneurysm    Osteoarthritis    H/O left wrist surgery    History of brain surgery    H/O total knee replacement, left    History of ankle fusion        Medications:  MEDICATIONS  (STANDING):  albuterol/ipratropium for Nebulization 3 milliLiter(s) Nebulizer every 6 hours  allopurinol 100 milliGRAM(s) Oral daily  aspirin enteric coated 81 milliGRAM(s) Oral daily  azithromycin  IVPB 500 milliGRAM(s) IV Intermittent every 24 hours  cefTRIAXone   IVPB 1000 milliGRAM(s) IV Intermittent every 24 hours  enoxaparin Injectable 40 milliGRAM(s) SubCutaneous every 24 hours  fluticasone propionate/ salmeterol 250-50 MICROgram(s) Diskus 1 Dose(s) Inhalation two times a day  hydrocodone/homatropine Syrup 5 milliLiter(s) Oral every 8 hours  influenza  Vaccine (HIGH DOSE) 0.5 milliLiter(s) IntraMuscular once  methylPREDNISolone sodium succinate Injectable 40 milliGRAM(s) IV Push every 8 hours  montelukast 10 milliGRAM(s) Oral daily  pregabalin 75 milliGRAM(s) Oral every 8 hours  tiotropium 2.5 MICROgram(s) Inhaler 2 Puff(s) Inhalation daily    MEDICATIONS  (PRN):  acetaminophen     Tablet .. 650 milliGRAM(s) Oral every 6 hours PRN Temp greater or equal to 38C (100.4F), Mild Pain (1 - 3)  aluminum hydroxide/magnesium hydroxide/simethicone Suspension 30 milliLiter(s) Oral every 4 hours PRN Dyspepsia  melatonin 3 milliGRAM(s) Oral at bedtime PRN Insomnia  ondansetron Injectable 4 milliGRAM(s) IV Push every 8 hours PRN Nausea and/or Vomiting  zolpidem 5 milliGRAM(s) Oral at bedtime PRN Insomnia      Antibiotics History  azithromycin  IVPB 500 milliGRAM(s) IV Intermittent once, 11-23-24 @ 17:22, Stop order after: 1 Doses  azithromycin  IVPB 500 milliGRAM(s) IV Intermittent every 24 hours, 11-24-24 @ 00:00  cefTRIAXone   IVPB 1000 milliGRAM(s) IV Intermittent every 24 hours, 11-24-24 @ 00:00, Stop order after: 5 Days  cefTRIAXone   IVPB 1000 milliGRAM(s) IV Intermittent once, 11-23-24 @ 17:22, Stop order after: 1 Doses      Heme Medications   aspirin enteric coated 81 milliGRAM(s) Oral daily, 11-23-24 @ 17:33  enoxaparin Injectable 40 milliGRAM(s) SubCutaneous every 24 hours, 11-23-24 @ 17:34      GI Medications  aluminum hydroxide/magnesium hydroxide/simethicone Suspension 30 milliLiter(s) Oral every 4 hours, 11-23-24 @ 17:25, Routine PRN        LABS:                        13.3   11.22 )-----------( 199      ( 25 Nov 2024 06:08 )             39.7     11-25    140  |  105  |  24[H]  ----------------------------<  139[H]  4.5   |  29  |  0.98    Ca    8.9      25 Nov 2024 06:08  Mg     2.1     11-24      HIT ab -- 11-23 @ 16:00  HIT ab EIA --  D Dimer -213            Urinalysis Basic - ( 25 Nov 2024 06:08 )    Color: x / Appearance: x / SG: x / pH: x  Gluc: 139 mg/dL / Ketone: x  / Bili: x / Urobili: x   Blood: x / Protein: x / Nitrite: x   Leuk Esterase: x / RBC: x / WBC x   Sq Epi: x / Non Sq Epi: x / Bacteria: x                VITALS:  T(C): 36.6 (11-25-24 @ 04:51), Max: 36.8 (11-24-24 @ 20:52)  T(F): 97.9 (11-25-24 @ 04:51), Max: 98.3 (11-24-24 @ 20:52)  HR: 73 (11-25-24 @ 15:05) (63 - 75)  BP: 158/80 (11-25-24 @ 04:51) (151/74 - 158/80)  BP(mean): --  ABP: --  ABP(mean): --  RR: 17 (11-25-24 @ 04:51) (16 - 17)  SpO2: 93% (11-25-24 @ 15:05) (93% - 95%)  CVP(mm Hg): --  CVP(cm H2O): --    Ins and Outs     11-24-24 @ 07:01  -  11-25-24 @ 07:00  --------------------------------------------------------  IN: 200 mL / OUT: 0 mL / NET: 200 mL        Height (cm): 177.8 (11-23-24 @ 19:54)  Weight (kg): 100.4 (11-23-24 @ 19:54)  BMI (kg/m2): 31.8 (11-23-24 @ 19:54)        I&O's Detail    24 Nov 2024 07:01  -  25 Nov 2024 07:00  --------------------------------------------------------  IN:    Oral Fluid: 200 mL  Total IN: 200 mL    OUT:  Total OUT: 0 mL    Total NET: 200 mL   
Patient is a 79y old  Male who presents with a chief complaint of Shortness of breath (26 Nov 2024 12:55)      INTERVAL HPI/OVERNIGHT EVENTS: Patient seen and examined at bedside. No overnight events occurred. Patient had one incidence of running out of breath when reaching for his coat, however was able to ambulate in the room without running out of breath. Denies fevers, chills, headache, lightheadedness, chest pain, abdominal pain, n/v/d/c.    MEDICATIONS  (STANDING):  albuterol/ipratropium for Nebulization 3 milliLiter(s) Nebulizer every 6 hours  allopurinol 100 milliGRAM(s) Oral daily  aspirin enteric coated 81 milliGRAM(s) Oral daily  cefTRIAXone   IVPB 1000 milliGRAM(s) IV Intermittent every 24 hours  enoxaparin Injectable 40 milliGRAM(s) SubCutaneous every 24 hours  fluticasone propionate/ salmeterol 250-50 MICROgram(s) Diskus 1 Dose(s) Inhalation two times a day  hydrocodone/homatropine Syrup 5 milliLiter(s) Oral every 8 hours  influenza  Vaccine (HIGH DOSE) 0.5 milliLiter(s) IntraMuscular once  methylPREDNISolone sodium succinate Injectable 40 milliGRAM(s) IV Push every 12 hours  montelukast 10 milliGRAM(s) Oral daily  pregabalin 75 milliGRAM(s) Oral every 8 hours  tiotropium 2.5 MICROgram(s) Inhaler 2 Puff(s) Inhalation daily    MEDICATIONS  (PRN):  acetaminophen     Tablet .. 650 milliGRAM(s) Oral every 6 hours PRN Temp greater or equal to 38C (100.4F), Mild Pain (1 - 3)  aluminum hydroxide/magnesium hydroxide/simethicone Suspension 30 milliLiter(s) Oral every 4 hours PRN Dyspepsia  melatonin 3 milliGRAM(s) Oral at bedtime PRN Insomnia  ondansetron Injectable 4 milliGRAM(s) IV Push every 8 hours PRN Nausea and/or Vomiting  zolpidem 5 milliGRAM(s) Oral at bedtime PRN Insomnia      Allergies    No Known Allergies    Intolerances        REVIEW OF SYSTEMS:  CONSTITUTIONAL: No fever or chills  HEENT:  No headache, no sore throat  RESPIRATORY: No cough, wheezing, or shortness of breath  CARDIOVASCULAR: No chest pain, palpitations  GASTROINTESTINAL: No abd pain, nausea, vomiting, or diarrhea  GENITOURINARY: No dysuria, frequency, or hematuria  NEUROLOGICAL: no focal weakness or dizziness  MUSCULOSKELETAL: no myalgias     Vital Signs Last 24 Hrs  T(C): 36.9 (26 Nov 2024 13:39), Max: 36.9 (25 Nov 2024 20:48)  T(F): 98.4 (26 Nov 2024 13:39), Max: 98.5 (25 Nov 2024 20:48)  HR: 94 (26 Nov 2024 13:39) (70 - 94)  BP: 156/87 (26 Nov 2024 13:39) (136/84 - 157/88)  BP(mean): --  RR: 16 (26 Nov 2024 13:39) (16 - 18)  SpO2: 93% (26 Nov 2024 13:39) (93% - 95%)    Parameters below as of 26 Nov 2024 13:39  Patient On (Oxygen Delivery Method): room air      I&O's Summary    25 Nov 2024 07:01  -  26 Nov 2024 07:00  --------------------------------------------------------  IN: 200 mL / OUT: 0 mL / NET: 200 mL      BMI (kg/m2): 31.8 (11-23-24 @ 19:54)    PHYSICAL EXAM:  GENERAL: NAD  HEENT:  AT/NC, anicteric, moist mucous membranes, EOMI, PERRL, no lid-lag, conjunctiva and sclera clear  CHEST/LUNG:  CTA b/l, no rales, wheezes, or rhonchi,  normal respiratory effort, no intercostal retractions  HEART:  RRR, S1, S2, no murmurs; no pitting edema  ABDOMEN:  BS+, soft, nontender, nondistended; No HSM  MSK/EXTREMITIES: 2+ peripheral pulses, no clubbing or cyanosis  NERVOUS SYSTEM: answers questions and follows commands appropriately, A&Ox3 grossly moves all extremities   PSYCH: Appropriate affect, Alert & Awake; Good judgement      LABS: Personally reviewed  CBC                        13.9   12.27 )-----------( 205      ( 26 Nov 2024 06:44 )             41.6     CMP  11-26    141  |  104  |  26  ----------------------------<  127  4.3   |  32  |  0.83    Ca    9.0      26 Nov 2024 06:44  Phos  3.7     11-26  Mg     2.4     11-26    TPro  6.5  /  Alb  3.3  /  TBili  0.4  /  DBili  x   /  AST  27  /  ALT  24  /  AlkPhos  68  11-23                CARDIAC MARKERS ( 23 Nov 2024 16:00 )  x     / 9.9 ng/L / x     / x     / x          11-24 Chol 166 mg/dL LDL -- HDL 60 mg/dL Trig 46 mg/dL                  Urinalysis Basic - ( 26 Nov 2024 06:44 )    Color: x / Appearance: x / SG: x / pH: x  Gluc: 127 mg/dL / Ketone: x  / Bili: x / Urobili: x   Blood: x / Protein: x / Nitrite: x   Leuk Esterase: x / RBC: x / WBC x   Sq Epi: x / Non Sq Epi: x / Bacteria: x        COVID-19 PCR: NotDetec (11-23-24 @ 16:00)          RADIOLOGY & ADDITIONAL TESTS: Personally reviewed.     Consultant(s) Notes Reviewed:  [x] YES  [ ] NO   Discussed with CARLITOS/RAFA, RN    
Patient is a 79y old  Male who presents with a chief complaint of Shortness of breath (24 Nov 2024 15:37)    HPI/OVERNIGHT EVENTS: Patient seen and examined at bedside. No overnight events. pt reports sig improvement with sob. denies chest pain, palpitation, leg swelling.     MEDICATIONS  (STANDING):  albuterol/ipratropium for Nebulization 3 milliLiter(s) Nebulizer every 6 hours  allopurinol 100 milliGRAM(s) Oral daily  aspirin enteric coated 81 milliGRAM(s) Oral daily  azithromycin  IVPB 500 milliGRAM(s) IV Intermittent every 24 hours  cefTRIAXone   IVPB 1000 milliGRAM(s) IV Intermittent every 24 hours  enoxaparin Injectable 40 milliGRAM(s) SubCutaneous every 24 hours  fluticasone propionate/ salmeterol 250-50 MICROgram(s) Diskus 1 Dose(s) Inhalation two times a day  hydrocodone/homatropine Syrup 5 milliLiter(s) Oral every 8 hours  influenza  Vaccine (HIGH DOSE) 0.5 milliLiter(s) IntraMuscular once  methylPREDNISolone sodium succinate Injectable 40 milliGRAM(s) IV Push every 8 hours  montelukast 10 milliGRAM(s) Oral daily  pregabalin 75 milliGRAM(s) Oral every 8 hours  tiotropium 2.5 MICROgram(s) Inhaler 2 Puff(s) Inhalation daily    MEDICATIONS  (PRN):  acetaminophen     Tablet .. 650 milliGRAM(s) Oral every 6 hours PRN Temp greater or equal to 38C (100.4F), Mild Pain (1 - 3)  aluminum hydroxide/magnesium hydroxide/simethicone Suspension 30 milliLiter(s) Oral every 4 hours PRN Dyspepsia  melatonin 3 milliGRAM(s) Oral at bedtime PRN Insomnia  ondansetron Injectable 4 milliGRAM(s) IV Push every 8 hours PRN Nausea and/or Vomiting  zolpidem 5 milliGRAM(s) Oral at bedtime PRN Insomnia      Allergies    No Known Allergies    Intolerances        REVIEW OF SYSTEMS:  CONSTITUTIONAL: No fever or chills  HEENT:  No headache, no sore throat  RESPIRATORY: + cough, + wheezing, + shortness of breath  CARDIOVASCULAR: No chest pain, palpitations  GASTROINTESTINAL: No abd pain, nausea, vomiting, or diarrhea  GENITOURINARY: No dysuria, frequency, or hematuria  NEUROLOGICAL: no focal weakness or dizziness  MUSCULOSKELETAL: no myalgias     Vital Signs Last 24 Hrs  T(C): 36.7 (24 Nov 2024 13:36), Max: 36.8 (23 Nov 2024 18:41)  T(F): 98 (24 Nov 2024 13:36), Max: 98.2 (23 Nov 2024 18:41)  HR: 73 (24 Nov 2024 15:56) (68 - 96)  BP: 129/76 (24 Nov 2024 13:36) (118/75 - 160/83)  BP(mean): --  RR: 16 (24 Nov 2024 13:36) (15 - 18)  SpO2: 93% (24 Nov 2024 15:56) (93% - 96%)    Parameters below as of 24 Nov 2024 15:56  Patient On (Oxygen Delivery Method): room air      I&O's Summary    BMI (kg/m2): 31.8 (11-23-24 @ 19:54)    PHYSICAL EXAM:  GENERAL: NAD  HEENT:  AT/NC, anicteric, moist mucous membranes, EOMI, PERRL, conjunctiva and sclera clear  CHEST/LUNG:  +expiratory wheezes b/,  normal respiratory effort, no intercostal retractions  HEART:  RRR, S1, S2, no murmurs; no pitting edema  ABDOMEN:  BS+, soft, nontender, nondistended; No HSM  MSK/EXTREMITIES: 2+ peripheral pulses, no clubbing or cyanosis  NERVOUS SYSTEM: answers questions and follows commands appropriately, A&Ox3 grossly moves all extremities   PSYCH: Appropriate affect, Alert & Awake; Good judgement      LABS: Personally reviewed  CBC                        13.2   7.42  )-----------( 186      ( 24 Nov 2024 06:02 )             39.9     CMP  11-24    139  |  105  |  19  ----------------------------<  151  4.6   |  28  |  0.91    Ca    8.8      24 Nov 2024 06:02  Mg     2.1     11-24    TPro  6.5  /  Alb  3.3  /  TBili  0.4  /  DBili  x   /  AST  27  /  ALT  24  /  AlkPhos  68  11-23      CARDIAC MARKERS ( 23 Nov 2024 16:00 )  x     / 9.9 ng/L / x     / x     / x          11-24 Chol 166 mg/dL LDL -- HDL 60 mg/dL Trig 46 mg/dL      Urinalysis Basic - ( 24 Nov 2024 06:02 )    Color: x / Appearance: x / SG: x / pH: x  Gluc: 151 mg/dL / Ketone: x  / Bili: x / Urobili: x   Blood: x / Protein: x / Nitrite: x   Leuk Esterase: x / RBC: x / WBC x   Sq Epi: x / Non Sq Epi: x / Bacteria: x      COVID-19 PCR: NotDetec (11-23-24 @ 16:00)    US LE doppler (11/24/24)  IMPRESSION:  No evidence of deep venous thrombosis in either lower extremity.

## 2024-11-27 NOTE — DISCHARGE NOTE PROVIDER - ATTENDING DISCHARGE PHYSICAL EXAMINATION:
A+Ox3, no murmurs, minimal expiratory wheezing throughout the lung fields bilaterally, abd soft/NT/ND, no lower extremity swelling

## 2024-11-27 NOTE — DISCHARGE NOTE PROVIDER - NSDCMRMEDTOKEN_GEN_ALL_CORE_FT
albuterol 2.5 mg/3 mL (0.083%) inhalation solution: 3 milliliter(s) by nebulizer as needed  albuterol 90 mcg/inh inhalation aerosol: 2 puff(s) inhaled as needed  allopurinol 100 mg oral tablet: 1 tab(s) orally once a day  aspirin 81 mg oral tablet: 1 tab(s) orally once a day  pregabalin 75 mg oral capsule: 1 cap(s) orally as needed  Trelegy Ellipta 200 mcg-62.5 mcg-25 mcg/inh inhalation powder: 1 puff(s) inhaled once a day  zolpidem 10 mg oral tablet: 1 tab(s) orally once a day (at bedtime) as needed   albuterol 2.5 mg/3 mL (0.083%) inhalation solution: 3 milliliter(s) by nebulizer as needed  albuterol 90 mcg/inh inhalation aerosol: 2 puff(s) inhaled as needed  allopurinol 100 mg oral tablet: 1 tab(s) orally once a day  aspirin 81 mg oral tablet: 1 tab(s) orally once a day  predniSONE 10 mg oral tablet: 1 tab(s) orally once a day Take 4 (10 mg) pills from 11/28 - 11/30 for a total of 40 mg per day. Take 3 (10 mg) pills from 12/1 - 12/3 for a total of 30 mg per day. Take 2 (10 mg) pills from 12/4 - 12/6 for a total of 20 mg per day. Take 1 (10 mg) pills from 12/7 - 12/9 for a total of 10 mg per day.  pregabalin 75 mg oral capsule: 1 cap(s) orally once a day as needed for pain as needed  Trelegy Ellipta 200 mcg-62.5 mcg-25 mcg/inh inhalation powder: 1 puff(s) inhaled once a day  zolpidem 10 mg oral tablet: 1 tab(s) orally once a day (at bedtime) as needed

## 2024-11-27 NOTE — DISCHARGE NOTE PROVIDER - NSDCCPCAREPLAN_GEN_ALL_CORE_FT
PRINCIPAL DISCHARGE DIAGNOSIS  Diagnosis: Acute asthma exacerbation  Assessment and Plan of Treatment: You came to the hospital due to increased shortness of breath. You were diagnosed with asthma exaccerbation secondary to RSV. Respiratory syncytial virus (RSV) infection is a viral illness that causes symptoms like those of a bad cold, such as a runny nose, a sore throat, coughing, and wheezing. RSV spreads easily. It’s most common in babies, but anyone can get it. And you can get it more than once. RSV infection often goes away in 1 to 2 weeks. Most people who have it get better with home care. You were treated with IV steroids, duonebs, and asthma medications.  You were prescribed the following new medications: Prednisone. Please take your medication as prescribed. Please finish your course of steroid (Prednisone). Please follow up with your primary care provider within 1 weeks of discharge.     PRINCIPAL DISCHARGE DIAGNOSIS  Diagnosis: Acute asthma exacerbation  Assessment and Plan of Treatment: You came to the hospital due to increased shortness of breath. You were diagnosed with asthma exaccerbation secondary to RSV. Respiratory syncytial virus (RSV) infection is a viral illness that causes symptoms like those of a bad cold, such as a runny nose, a sore throat, coughing, and wheezing. RSV spreads easily. It’s most common in babies, but anyone can get it. And you can get it more than once. RSV infection often goes away in 1 to 2 weeks. Most people who have it get better with home care. You were treated with IV steroids, duonebs, and asthma medications.  You were prescribed the following new medications: Prednisone. Please take your medication as prescribed. Please finish your course of steroid (Prednisone). Please follow up with your primary care provider within 1 weeks of discharge.  You came to the hospital due to: shortness of breath  You were diagnosed with: Asthma exacerbation due to RSV  You were treated with: short course antibiotics initially, steroids and nebulizations  You were prescribed the following new medications: prednisone taper        PRINCIPAL DISCHARGE DIAGNOSIS  Diagnosis: Acute asthma exacerbation  Assessment and Plan of Treatment: You came to the hospital due to increased shortness of breath. You were diagnosed with asthma exaccerbation secondary to RSV. Respiratory syncytial virus (RSV) infection is a viral illness that causes symptoms like those of a bad cold, such as a runny nose, a sore throat, coughing, and wheezing. RSV spreads easily. It’s most common in babies, but anyone can get it. And you can get it more than once. RSV infection often goes away in 1 to 2 weeks. Most people who have it get better with home care. You were treated with IV steroids, duonebs, and asthma medications.  You were prescribed the following new medications: Prednisone. Please take your medication as prescribed. Please finish your course of steroid (Prednisone). Please follow up with your primary care provider within 1 weeks of discharge.  You came to the hospital due to: shortness of breath  You were diagnosed with: Asthma exacerbation due to RSV  You were treated with: short course antibiotics initially, steroids and nebulizations  You were prescribed the following new medications: prednisone taper         SECONDARY DISCHARGE DIAGNOSES  Diagnosis: RSV infection  Assessment and Plan of Treatment:     Diagnosis: Lung nodules  Assessment and Plan of Treatment:     Diagnosis: Leukocytosis  Assessment and Plan of Treatment:     Diagnosis: History of cerebral aneurysm  Assessment and Plan of Treatment:     Diagnosis: PVD (peripheral vascular disease)  Assessment and Plan of Treatment:     Diagnosis: Osteoarthritis  Assessment and Plan of Treatment:     Diagnosis: History of prostate cancer  Assessment and Plan of Treatment:     Diagnosis: Bronchospasm  Assessment and Plan of Treatment:

## 2024-11-27 NOTE — PROGRESS NOTE ADULT - ASSESSMENT
79 yr old male from home with PMH of recent diagnosed prostate cancer, s/p transurethral focal water vapor ablation on 07/01/24, asthma (controlled no recent hosp or exacerbation), gout, former smoker (quit 1990) and lung nodule, cerebral aneurysm (sx 2019 [place]), PVD, osteoarthritis presenting to ER with cc of SOB for the past couple of weeks.      #SOB 2/2 acute asthma exacerbation likely due to viral PNA   #hx of bilateral lung nodules   - admit to medicine with telemetry and pulse oximetry  - CT chest done 11/6 with stable lung nodule; f/u pulm outpatient for repeat imaging in 6 months (Out pat pulm f/u with Dr. Mason)  - RSV positive   - trop neg  - TTE: lvef: 55 to 60 %.  - DDimer neg, no DVT on LE doppler   - Incentive spirometer, OOB chair  - PT eval: home PT   - pulm rec appreciated:        taper steroids (start prednisone 40mg today, taper 10 mg every 3 days to off)       c/w nebs        c/w advair and spiriva while here (pt on trelegy at home)       c/w singulair 10gm qd       hycodan 5ml Q8h x 3 days for cough       c/w Azithromycin, CTX       check o2 sat on exertion   -Patient tolerated ambulation trial without desating or increased SOB.  -Patient stable for d/c             #Recent prostate cancer, s/p surgery  - F/u outpatient; not on further treatment    #Gout  - C/w allopurinol    #Hx of cerebral neurysm 2019  - Stable    #OA  #Neuropathy  - Lyrica 75mg TID  - LE doppler neg DVT    #Insomnia  - C/w ambien 5mg qhs prn    DVT PPX: Lovenox  Full code        Discussed with Dr Daniels  79 yr old male from home with PMH of recent diagnosed prostate cancer, s/p transurethral focal water vapor ablation on 07/01/24, asthma (controlled no recent hosp or exacerbation), gout, former smoker (quit 1990) and lung nodule, cerebral aneurysm (sx 2019 [place]), PVD, osteoarthritis presenting to ER with cc of SOB for the past couple of weeks.      #SOB 2/2 acute asthma exacerbation likely due to RSV/viral PNA   #hx of bilateral lung nodules   - admit to medicine with telemetry and pulse oximetry  - CT chest done 11/6 with stable lung nodule; f/u pulm outpatient for repeat imaging in 6 months (Out pat pulm f/u with Dr. Mason)  - RSV positive   - trop neg  - TTE: lvef: 55 to 60 %.  - DDimer neg, no DVT on LE doppler   - Incentive spirometer, OOB chair  - PT eval: home PT   - pulm rec appreciated:        taper steroids (start prednisone 40mg today, taper 10 mg every 3 days to off)       c/w nebs        c/w advair and spiriva while here (pt on trelegy at home)       c/w singulair 10gm qd       hycodan 5ml Q8h x 3 days for cough       c/w Azithromycin, CTX       check o2 sat on exertion   -Patient tolerated ambulation trial without desating or increased SOB.  -Patient stable for d/c         #Recent prostate cancer, s/p surgery  - F/u outpatient; not on further treatment    #Gout  - C/w allopurinol    #Hx of cerebral neurysm 2019  - Stable    #OA  #Neuropathy  - Lyrica 75mg TID  - LE doppler neg DVT    #Insomnia  - C/w ambien 5mg qhs prn    DVT PPX: Lovenox  Full code        Discussed with Dr Daniels

## 2024-11-27 NOTE — DISCHARGE NOTE NURSING/CASE MANAGEMENT/SOCIAL WORK - FINANCIAL ASSISTANCE
[facility] provides services at a reduced cost to those who are determined to be eligible through [facility]’s financial assistance program. Information regarding [facility]’s financial assistance program can be found by going to [email or web address] or by calling [phone].

## 2024-11-29 ENCOUNTER — NON-APPOINTMENT (OUTPATIENT)
Age: 79
End: 2024-11-29

## 2024-12-03 ENCOUNTER — APPOINTMENT (OUTPATIENT)
Dept: FAMILY MEDICINE | Facility: CLINIC | Age: 79
End: 2024-12-03
Payer: MEDICARE

## 2024-12-03 VITALS
RESPIRATION RATE: 16 BRPM | HEART RATE: 77 BPM | TEMPERATURE: 97.4 F | SYSTOLIC BLOOD PRESSURE: 128 MMHG | DIASTOLIC BLOOD PRESSURE: 65 MMHG | OXYGEN SATURATION: 95 %

## 2024-12-03 DIAGNOSIS — J44.9 CHRONIC OBSTRUCTIVE PULMONARY DISEASE, UNSPECIFIED: ICD-10-CM

## 2024-12-03 DIAGNOSIS — G62.9 POLYNEUROPATHY, UNSPECIFIED: ICD-10-CM

## 2024-12-03 DIAGNOSIS — B33.8 OTHER SPECIFIED VIRAL DISEASES: ICD-10-CM

## 2024-12-03 DIAGNOSIS — Z09 ENCOUNTER FOR FOLLOW-UP EXAMINATION AFTER COMPLETED TREATMENT FOR CONDITIONS OTHER THAN MALIGNANT NEOPLASM: ICD-10-CM

## 2024-12-03 PROCEDURE — 99215 OFFICE O/P EST HI 40 MIN: CPT

## 2024-12-09 ENCOUNTER — RX RENEWAL (OUTPATIENT)
Age: 79
End: 2024-12-09

## 2024-12-10 ENCOUNTER — OFFICE (OUTPATIENT)
Dept: URBAN - METROPOLITAN AREA CLINIC 109 | Facility: CLINIC | Age: 79
Setting detail: OPHTHALMOLOGY
End: 2024-12-10
Payer: MEDICARE

## 2024-12-10 DIAGNOSIS — H25.13: ICD-10-CM

## 2024-12-10 DIAGNOSIS — H40.013: ICD-10-CM

## 2024-12-10 PROCEDURE — 92083 EXTENDED VISUAL FIELD XM: CPT | Performed by: OPHTHALMOLOGY

## 2024-12-10 PROCEDURE — 92133 CPTRZD OPH DX IMG PST SGM ON: CPT | Performed by: OPHTHALMOLOGY

## 2024-12-10 PROCEDURE — 92002 INTRM OPH EXAM NEW PATIENT: CPT | Performed by: OPHTHALMOLOGY

## 2024-12-10 PROCEDURE — 76514 ECHO EXAM OF EYE THICKNESS: CPT | Performed by: OPHTHALMOLOGY

## 2024-12-10 PROCEDURE — 92020 GONIOSCOPY: CPT | Performed by: OPHTHALMOLOGY

## 2024-12-10 ASSESSMENT — VISUAL ACUITY
OS_BCVA: 20/25-2
OD_BCVA: 20/NLP

## 2024-12-10 ASSESSMENT — REFRACTION_AUTOREFRACTION
OD_CYLINDER: -1.75
OS_SPHERE: +3.50
OD_SPHERE: +3.75
OS_AXIS: 88
OS_CYLINDER: -1.75
OD_AXIS: 103

## 2024-12-10 ASSESSMENT — REFRACTION_CURRENTRX
OS_ADD: +1.00
OS_AXIS: 91
OD_OVR_VA: 20/
OD_CYLINDER: -1.25
OD_AXIS: 115
OS_OVR_VA: 20/
OD_ADD: +1.00
OS_SPHERE: +4.00
OS_CYLINDER: -1.25
OD_SPHERE: +3.75

## 2024-12-10 ASSESSMENT — TONOMETRY
OD_IOP_MMHG: 15
OS_IOP_MMHG: 15

## 2024-12-10 ASSESSMENT — PACHYMETRY
OS_CT_UM: 572
OD_CT_CORRECTION: -2
OS_CT_CORRECTION: -2
OD_CT_UM: 570

## 2024-12-10 ASSESSMENT — CONFRONTATIONAL VISUAL FIELD TEST (CVF)
OD_FINDINGS: FULL
OS_FINDINGS: FULL

## 2024-12-12 ENCOUNTER — RX RENEWAL (OUTPATIENT)
Age: 79
End: 2024-12-12

## 2024-12-16 ENCOUNTER — APPOINTMENT (OUTPATIENT)
Dept: MRI IMAGING | Facility: IMAGING CENTER | Age: 79
End: 2024-12-16
Payer: SUBSIDIZED

## 2024-12-16 ENCOUNTER — RESULT REVIEW (OUTPATIENT)
Age: 79
End: 2024-12-16

## 2024-12-16 ENCOUNTER — OUTPATIENT (OUTPATIENT)
Dept: OUTPATIENT SERVICES | Facility: HOSPITAL | Age: 79
LOS: 1 days | End: 2024-12-16
Payer: SUBSIDIZED

## 2024-12-16 DIAGNOSIS — Z00.8 ENCOUNTER FOR OTHER GENERAL EXAMINATION: ICD-10-CM

## 2024-12-16 DIAGNOSIS — Z98.1 ARTHRODESIS STATUS: Chronic | ICD-10-CM

## 2024-12-16 DIAGNOSIS — Z96.652 PRESENCE OF LEFT ARTIFICIAL KNEE JOINT: Chronic | ICD-10-CM

## 2024-12-16 DIAGNOSIS — Z98.890 OTHER SPECIFIED POSTPROCEDURAL STATES: Chronic | ICD-10-CM

## 2024-12-16 DIAGNOSIS — C61 MALIGNANT NEOPLASM OF PROSTATE: ICD-10-CM

## 2024-12-16 PROCEDURE — 76498 UNLISTED MR PROCEDURE: CPT

## 2024-12-16 PROCEDURE — 72197 MRI PELVIS W/O & W/DYE: CPT

## 2024-12-16 PROCEDURE — 72197 MRI PELVIS W/O & W/DYE: CPT | Mod: 26

## 2024-12-16 PROCEDURE — 76498P: CUSTOM | Mod: 26

## 2024-12-16 PROCEDURE — A9585: CPT

## 2024-12-20 ENCOUNTER — APPOINTMENT (OUTPATIENT)
Dept: OTOLARYNGOLOGY | Facility: CLINIC | Age: 79
End: 2024-12-20

## 2024-12-20 VITALS
BODY MASS INDEX: 31.5 KG/M2 | HEIGHT: 70 IN | HEART RATE: 87 BPM | TEMPERATURE: 97.3 F | WEIGHT: 220 LBS | OXYGEN SATURATION: 98 %

## 2024-12-20 DIAGNOSIS — H61.20 IMPACTED CERUMEN, UNSPECIFIED EAR: ICD-10-CM

## 2024-12-20 PROCEDURE — 69210 REMOVE IMPACTED EAR WAX UNI: CPT

## 2024-12-20 PROCEDURE — 99213 OFFICE O/P EST LOW 20 MIN: CPT | Mod: 25

## 2024-12-20 RX ORDER — PREDNISONE 10 MG/1
10 TABLET ORAL
Qty: 30 | Refills: 0 | Status: ACTIVE | COMMUNITY
Start: 2024-11-27

## 2025-01-03 ENCOUNTER — APPOINTMENT (OUTPATIENT)
Dept: PULMONOLOGY | Facility: CLINIC | Age: 80
End: 2025-01-03
Payer: MEDICARE

## 2025-01-03 VITALS
SYSTOLIC BLOOD PRESSURE: 128 MMHG | TEMPERATURE: 97.6 F | BODY MASS INDEX: 31.5 KG/M2 | DIASTOLIC BLOOD PRESSURE: 78 MMHG | RESPIRATION RATE: 16 BRPM | WEIGHT: 220 LBS | OXYGEN SATURATION: 98 % | HEART RATE: 85 BPM | HEIGHT: 70 IN

## 2025-01-03 DIAGNOSIS — J44.1 CHRONIC OBSTRUCTIVE PULMONARY DISEASE WITH (ACUTE) EXACERBATION: ICD-10-CM

## 2025-01-03 PROCEDURE — G2211 COMPLEX E/M VISIT ADD ON: CPT

## 2025-01-03 PROCEDURE — 99214 OFFICE O/P EST MOD 30 MIN: CPT

## 2025-01-03 RX ORDER — PREDNISONE 10 MG/1
10 TABLET ORAL DAILY
Qty: 90 | Refills: 2 | Status: ACTIVE | COMMUNITY
Start: 2025-01-03 | End: 1900-01-01

## 2025-01-08 ENCOUNTER — NON-APPOINTMENT (OUTPATIENT)
Age: 80
End: 2025-01-08

## 2025-01-08 ENCOUNTER — RX RENEWAL (OUTPATIENT)
Age: 80
End: 2025-01-08

## 2025-01-16 ENCOUNTER — APPOINTMENT (OUTPATIENT)
Dept: UROLOGY | Facility: CLINIC | Age: 80
End: 2025-01-16
Payer: SUBSIDIZED

## 2025-01-16 ENCOUNTER — OUTPATIENT (OUTPATIENT)
Dept: OUTPATIENT SERVICES | Facility: HOSPITAL | Age: 80
LOS: 1 days | End: 2025-01-16
Payer: SUBSIDIZED

## 2025-01-16 VITALS — DIASTOLIC BLOOD PRESSURE: 92 MMHG | SYSTOLIC BLOOD PRESSURE: 162 MMHG

## 2025-01-16 DIAGNOSIS — Z98.890 OTHER SPECIFIED POSTPROCEDURAL STATES: Chronic | ICD-10-CM

## 2025-01-16 DIAGNOSIS — Z98.1 ARTHRODESIS STATUS: Chronic | ICD-10-CM

## 2025-01-16 DIAGNOSIS — R35.0 FREQUENCY OF MICTURITION: ICD-10-CM

## 2025-01-16 DIAGNOSIS — Z96.652 PRESENCE OF LEFT ARTIFICIAL KNEE JOINT: Chronic | ICD-10-CM

## 2025-01-16 DIAGNOSIS — C61 MALIGNANT NEOPLASM OF PROSTATE: ICD-10-CM

## 2025-01-16 PROCEDURE — 99024 POSTOP FOLLOW-UP VISIT: CPT

## 2025-01-16 PROCEDURE — 76999F: CUSTOM | Mod: 26

## 2025-01-16 PROCEDURE — 55700: CPT

## 2025-01-16 PROCEDURE — 76999 ECHO EXAMINATION PROCEDURE: CPT

## 2025-01-16 PROCEDURE — 99214 OFFICE O/P EST MOD 30 MIN: CPT | Mod: 25

## 2025-01-17 ENCOUNTER — NON-APPOINTMENT (OUTPATIENT)
Age: 80
End: 2025-01-17

## 2025-01-17 ENCOUNTER — APPOINTMENT (OUTPATIENT)
Dept: PULMONOLOGY | Facility: CLINIC | Age: 80
End: 2025-01-17
Payer: MEDICARE

## 2025-01-17 VITALS
OXYGEN SATURATION: 97 % | BODY MASS INDEX: 30.64 KG/M2 | WEIGHT: 214 LBS | TEMPERATURE: 97.5 F | DIASTOLIC BLOOD PRESSURE: 80 MMHG | RESPIRATION RATE: 16 BRPM | SYSTOLIC BLOOD PRESSURE: 130 MMHG | HEIGHT: 70 IN | HEART RATE: 51 BPM

## 2025-01-17 DIAGNOSIS — J44.89 OTHER SPECIFIED CHRONIC OBSTRUCTIVE PULMONARY DISEASE: ICD-10-CM

## 2025-01-17 PROCEDURE — 99213 OFFICE O/P EST LOW 20 MIN: CPT

## 2025-01-17 PROCEDURE — G2211 COMPLEX E/M VISIT ADD ON: CPT

## 2025-01-17 RX ORDER — AZITHROMYCIN 250 MG/1
250 TABLET, FILM COATED ORAL
Refills: 0 | Status: ACTIVE | COMMUNITY

## 2025-01-21 DIAGNOSIS — C61 MALIGNANT NEOPLASM OF PROSTATE: ICD-10-CM

## 2025-01-23 ENCOUNTER — OUTPATIENT (OUTPATIENT)
Dept: OUTPATIENT SERVICES | Facility: HOSPITAL | Age: 80
LOS: 1 days | End: 2025-01-23
Payer: MEDICARE

## 2025-01-23 ENCOUNTER — APPOINTMENT (OUTPATIENT)
Dept: RADIOLOGY | Facility: HOSPITAL | Age: 80
End: 2025-01-23

## 2025-01-23 DIAGNOSIS — J44.9 CHRONIC OBSTRUCTIVE PULMONARY DISEASE, UNSPECIFIED: ICD-10-CM

## 2025-01-23 DIAGNOSIS — Z98.890 OTHER SPECIFIED POSTPROCEDURAL STATES: Chronic | ICD-10-CM

## 2025-01-23 DIAGNOSIS — Z98.1 ARTHRODESIS STATUS: Chronic | ICD-10-CM

## 2025-01-23 DIAGNOSIS — Z96.652 PRESENCE OF LEFT ARTIFICIAL KNEE JOINT: Chronic | ICD-10-CM

## 2025-01-23 PROCEDURE — 77085 DXA BONE DENSITY AXL VRT FX: CPT | Mod: 26

## 2025-01-23 PROCEDURE — 77085 DXA BONE DENSITY AXL VRT FX: CPT

## 2025-01-25 LAB — PROSTATE BIOPSY: NORMAL

## 2025-01-27 ENCOUNTER — NON-APPOINTMENT (OUTPATIENT)
Age: 80
End: 2025-01-27

## 2025-01-28 ENCOUNTER — OFFICE (OUTPATIENT)
Dept: URBAN - METROPOLITAN AREA CLINIC 109 | Facility: CLINIC | Age: 80
Setting detail: OPHTHALMOLOGY
End: 2025-01-28
Payer: MEDICARE

## 2025-01-28 DIAGNOSIS — H25.13: ICD-10-CM

## 2025-01-28 DIAGNOSIS — H40.013: ICD-10-CM

## 2025-01-28 PROCEDURE — 92014 COMPRE OPH EXAM EST PT 1/>: CPT | Performed by: OPHTHALMOLOGY

## 2025-01-28 PROCEDURE — 92250 FUNDUS PHOTOGRAPHY W/I&R: CPT | Performed by: OPHTHALMOLOGY

## 2025-01-28 ASSESSMENT — REFRACTION_CURRENTRX
OD_AXIS: 123
OS_OVR_VA: 20/
OS_OVR_VA: 20/
OD_ADD: +1.00
OS_AXIS: 085
OS_VPRISM_DIRECTION: PROGS
OS_ADD: +2.25
OS_SPHERE: +4.00
OS_ADD: +1.00
OS_CYLINDER: -1.25
OD_SPHERE: +3.75
OD_OVR_VA: 20/
OD_VPRISM_DIRECTION: PROGS
OD_CYLINDER: -1.25
OD_OVR_VA: 20/
OS_SPHERE: +4.00
OS_AXIS: 91
OD_ADD: +2.25
OD_CYLINDER: -1.25
OD_SPHERE: +3.25
OD_AXIS: 115
OS_CYLINDER: -1.25

## 2025-01-28 ASSESSMENT — TONOMETRY
OD_IOP_MMHG: 12
OD_IOP_MMHG: 17
OS_IOP_MMHG: 16
OS_IOP_MMHG: 17

## 2025-01-28 ASSESSMENT — PACHYMETRY
OD_CT_CORRECTION: -2
OS_CT_CORRECTION: -2
OS_CT_UM: 572
OD_CT_UM: 570

## 2025-01-28 ASSESSMENT — REFRACTION_AUTOREFRACTION
OD_SPHERE: +4.25
OS_CYLINDER: -2.00
OD_CYLINDER: -2.00
OD_AXIS: 105
OS_SPHERE: +3.50
OS_AXIS: 092

## 2025-01-28 ASSESSMENT — KERATOMETRY
OD_K2POWER_DIOPTERS: 45.75
OS_K2POWER_DIOPTERS: 46.25
OD_K1POWER_DIOPTERS: 44.50
OD_AXISANGLE_DEGREES: 013
OS_K1POWER_DIOPTERS: 44.50
OS_AXISANGLE_DEGREES: 175

## 2025-01-28 ASSESSMENT — VISUAL ACUITY
OS_BCVA: 20/20
OD_BCVA: 20/NLP

## 2025-01-28 ASSESSMENT — CONFRONTATIONAL VISUAL FIELD TEST (CVF)
OD_FINDINGS: FULL
OS_FINDINGS: FULL

## 2025-01-30 ENCOUNTER — APPOINTMENT (OUTPATIENT)
Dept: UROLOGY | Facility: CLINIC | Age: 80
End: 2025-01-30

## 2025-02-06 ENCOUNTER — APPOINTMENT (OUTPATIENT)
Dept: FAMILY MEDICINE | Facility: CLINIC | Age: 80
End: 2025-02-06

## 2025-02-13 ENCOUNTER — APPOINTMENT (OUTPATIENT)
Dept: FAMILY MEDICINE | Facility: CLINIC | Age: 80
End: 2025-02-13
Payer: MEDICARE

## 2025-02-13 VITALS
RESPIRATION RATE: 16 BRPM | HEART RATE: 80 BPM | BODY MASS INDEX: 31.21 KG/M2 | OXYGEN SATURATION: 95 % | SYSTOLIC BLOOD PRESSURE: 151 MMHG | TEMPERATURE: 97.8 F | DIASTOLIC BLOOD PRESSURE: 75 MMHG | WEIGHT: 218 LBS | HEIGHT: 70 IN

## 2025-02-13 DIAGNOSIS — M85.80 OTHER SPECIFIED DISORDERS OF BONE DENSITY AND STRUCTURE, UNSPECIFIED SITE: ICD-10-CM

## 2025-02-13 DIAGNOSIS — J44.9 CHRONIC OBSTRUCTIVE PULMONARY DISEASE, UNSPECIFIED: ICD-10-CM

## 2025-02-13 PROCEDURE — 99214 OFFICE O/P EST MOD 30 MIN: CPT

## 2025-02-13 PROCEDURE — G2211 COMPLEX E/M VISIT ADD ON: CPT

## 2025-02-15 ENCOUNTER — RX RENEWAL (OUTPATIENT)
Age: 80
End: 2025-02-15

## 2025-02-15 PROBLEM — M85.80 OSTEOPENIA, UNSPECIFIED LOCATION: Status: ACTIVE | Noted: 2025-02-15

## 2025-02-19 ENCOUNTER — RX RENEWAL (OUTPATIENT)
Age: 80
End: 2025-02-19

## 2025-03-18 ENCOUNTER — RX RENEWAL (OUTPATIENT)
Age: 80
End: 2025-03-18

## 2025-03-19 ENCOUNTER — APPOINTMENT (OUTPATIENT)
Dept: OTOLARYNGOLOGY | Facility: CLINIC | Age: 80
End: 2025-03-19
Payer: MEDICARE

## 2025-03-19 VITALS
HEIGHT: 70 IN | TEMPERATURE: 97.3 F | WEIGHT: 218 LBS | BODY MASS INDEX: 31.21 KG/M2 | HEART RATE: 79 BPM | OXYGEN SATURATION: 98 %

## 2025-03-19 DIAGNOSIS — H61.23 IMPACTED CERUMEN, BILATERAL: ICD-10-CM

## 2025-03-19 PROCEDURE — 69210 REMOVE IMPACTED EAR WAX UNI: CPT

## 2025-03-19 PROCEDURE — 99213 OFFICE O/P EST LOW 20 MIN: CPT | Mod: 25

## 2025-03-25 ENCOUNTER — RX RENEWAL (OUTPATIENT)
Age: 80
End: 2025-03-25

## 2025-04-02 ENCOUNTER — APPOINTMENT (OUTPATIENT)
Dept: NEUROLOGY | Facility: CLINIC | Age: 80
End: 2025-04-02
Payer: MEDICARE

## 2025-04-02 ENCOUNTER — NON-APPOINTMENT (OUTPATIENT)
Age: 80
End: 2025-04-02

## 2025-04-02 VITALS
HEART RATE: 60 BPM | OXYGEN SATURATION: 95 % | TEMPERATURE: 98.6 F | SYSTOLIC BLOOD PRESSURE: 145 MMHG | DIASTOLIC BLOOD PRESSURE: 78 MMHG | BODY MASS INDEX: 31.21 KG/M2 | WEIGHT: 218 LBS | HEIGHT: 70 IN

## 2025-04-02 DIAGNOSIS — G62.9 POLYNEUROPATHY, UNSPECIFIED: ICD-10-CM

## 2025-04-02 PROCEDURE — 99215 OFFICE O/P EST HI 40 MIN: CPT

## 2025-04-02 PROCEDURE — G2211 COMPLEX E/M VISIT ADD ON: CPT

## 2025-04-17 ENCOUNTER — APPOINTMENT (OUTPATIENT)
Dept: PULMONOLOGY | Facility: CLINIC | Age: 80
End: 2025-04-17
Payer: MEDICARE

## 2025-04-17 VITALS
RESPIRATION RATE: 16 BRPM | SYSTOLIC BLOOD PRESSURE: 134 MMHG | OXYGEN SATURATION: 95 % | TEMPERATURE: 97.7 F | HEIGHT: 70 IN | HEART RATE: 52 BPM | WEIGHT: 234 LBS | DIASTOLIC BLOOD PRESSURE: 78 MMHG | BODY MASS INDEX: 33.5 KG/M2

## 2025-04-17 DIAGNOSIS — J44.9 CHRONIC OBSTRUCTIVE PULMONARY DISEASE, UNSPECIFIED: ICD-10-CM

## 2025-04-17 DIAGNOSIS — R91.8 OTHER NONSPECIFIC ABNORMAL FINDING OF LUNG FIELD: ICD-10-CM

## 2025-04-17 PROCEDURE — 99213 OFFICE O/P EST LOW 20 MIN: CPT

## 2025-04-17 PROCEDURE — G2211 COMPLEX E/M VISIT ADD ON: CPT

## 2025-04-19 DIAGNOSIS — N13.9 OBSTRUCTIVE AND REFLUX UROPATHY, UNSPECIFIED: ICD-10-CM

## 2025-04-19 DIAGNOSIS — E55.9 VITAMIN D DEFICIENCY, UNSPECIFIED: ICD-10-CM

## 2025-04-19 DIAGNOSIS — E78.00 PURE HYPERCHOLESTEROLEMIA, UNSPECIFIED: ICD-10-CM

## 2025-04-19 DIAGNOSIS — C61 MALIGNANT NEOPLASM OF PROSTATE: ICD-10-CM

## 2025-04-19 DIAGNOSIS — M10.9 GOUT, UNSPECIFIED: ICD-10-CM

## 2025-04-19 DIAGNOSIS — R79.89 OTHER SPECIFIED ABNORMAL FINDINGS OF BLOOD CHEMISTRY: ICD-10-CM

## 2025-04-24 ENCOUNTER — RX RENEWAL (OUTPATIENT)
Age: 80
End: 2025-04-24

## 2025-04-25 ENCOUNTER — APPOINTMENT (OUTPATIENT)
Dept: PULMONOLOGY | Facility: CLINIC | Age: 80
End: 2025-04-25
Payer: MEDICARE

## 2025-04-25 VITALS
DIASTOLIC BLOOD PRESSURE: 74 MMHG | OXYGEN SATURATION: 98 % | SYSTOLIC BLOOD PRESSURE: 132 MMHG | TEMPERATURE: 97.8 F | RESPIRATION RATE: 17 BRPM | HEART RATE: 66 BPM | WEIGHT: 232 LBS | HEIGHT: 70 IN | BODY MASS INDEX: 33.21 KG/M2

## 2025-04-25 DIAGNOSIS — J44.89 OTHER SPECIFIED CHRONIC OBSTRUCTIVE PULMONARY DISEASE: ICD-10-CM

## 2025-04-25 DIAGNOSIS — R93.89 ABNORMAL FINDINGS ON DIAGNOSTIC IMAGING OF OTHER SPECIFIED BODY STRUCTURES: ICD-10-CM

## 2025-04-25 PROCEDURE — 95012 NITRIC OXIDE EXP GAS DETER: CPT

## 2025-04-25 PROCEDURE — 99213 OFFICE O/P EST LOW 20 MIN: CPT | Mod: 25

## 2025-04-29 ENCOUNTER — APPOINTMENT (OUTPATIENT)
Dept: CT IMAGING | Facility: HOSPITAL | Age: 80
End: 2025-04-29

## 2025-04-29 ENCOUNTER — OUTPATIENT (OUTPATIENT)
Dept: OUTPATIENT SERVICES | Facility: HOSPITAL | Age: 80
LOS: 1 days | End: 2025-04-29
Payer: MEDICARE

## 2025-04-29 DIAGNOSIS — J44.9 CHRONIC OBSTRUCTIVE PULMONARY DISEASE, UNSPECIFIED: ICD-10-CM

## 2025-04-29 DIAGNOSIS — Z98.1 ARTHRODESIS STATUS: Chronic | ICD-10-CM

## 2025-04-29 DIAGNOSIS — Z98.890 OTHER SPECIFIED POSTPROCEDURAL STATES: Chronic | ICD-10-CM

## 2025-04-29 DIAGNOSIS — Z96.652 PRESENCE OF LEFT ARTIFICIAL KNEE JOINT: Chronic | ICD-10-CM

## 2025-04-29 PROCEDURE — 71250 CT THORAX DX C-: CPT | Mod: 26

## 2025-04-29 PROCEDURE — 71250 CT THORAX DX C-: CPT

## 2025-05-02 ENCOUNTER — RX RENEWAL (OUTPATIENT)
Age: 80
End: 2025-05-02

## 2025-05-07 ENCOUNTER — NON-APPOINTMENT (OUTPATIENT)
Age: 80
End: 2025-05-07

## 2025-05-08 ENCOUNTER — APPOINTMENT (OUTPATIENT)
Dept: FAMILY MEDICINE | Facility: CLINIC | Age: 80
End: 2025-05-08

## 2025-05-08 VITALS
WEIGHT: 236 LBS | HEIGHT: 70 IN | TEMPERATURE: 97.5 F | BODY MASS INDEX: 33.79 KG/M2 | OXYGEN SATURATION: 95 % | HEART RATE: 78 BPM | RESPIRATION RATE: 17 BRPM | DIASTOLIC BLOOD PRESSURE: 76 MMHG | SYSTOLIC BLOOD PRESSURE: 149 MMHG

## 2025-05-08 DIAGNOSIS — R41.3 OTHER AMNESIA: ICD-10-CM

## 2025-05-08 DIAGNOSIS — G62.9 POLYNEUROPATHY, UNSPECIFIED: ICD-10-CM

## 2025-05-08 DIAGNOSIS — R80.9 PROTEINURIA, UNSPECIFIED: ICD-10-CM

## 2025-05-08 PROCEDURE — G2211 COMPLEX E/M VISIT ADD ON: CPT

## 2025-05-08 PROCEDURE — 99215 OFFICE O/P EST HI 40 MIN: CPT

## 2025-05-13 PROBLEM — R80.9 PROTEINURIA: Status: ACTIVE | Noted: 2025-05-13

## 2025-05-19 ENCOUNTER — NON-APPOINTMENT (OUTPATIENT)
Age: 80
End: 2025-05-19

## 2025-05-22 ENCOUNTER — APPOINTMENT (OUTPATIENT)
Dept: UROLOGY | Facility: CLINIC | Age: 80
End: 2025-05-22
Payer: SUBSIDIZED

## 2025-05-22 ENCOUNTER — NON-APPOINTMENT (OUTPATIENT)
Age: 80
End: 2025-05-22

## 2025-05-22 VITALS
WEIGHT: 230 LBS | HEART RATE: 70 BPM | SYSTOLIC BLOOD PRESSURE: 174 MMHG | RESPIRATION RATE: 14 BRPM | HEIGHT: 70 IN | OXYGEN SATURATION: 94 % | DIASTOLIC BLOOD PRESSURE: 98 MMHG | BODY MASS INDEX: 32.93 KG/M2

## 2025-05-22 DIAGNOSIS — C61 MALIGNANT NEOPLASM OF PROSTATE: ICD-10-CM

## 2025-05-22 DIAGNOSIS — N13.8 BENIGN PROSTATIC HYPERPLASIA WITH LOWER URINARY TRACT SYMPMS: ICD-10-CM

## 2025-05-22 DIAGNOSIS — N40.1 BENIGN PROSTATIC HYPERPLASIA WITH LOWER URINARY TRACT SYMPMS: ICD-10-CM

## 2025-05-22 DIAGNOSIS — Z87.438 PERSONAL HISTORY OF OTHER DISEASES OF MALE GENITAL ORGANS: ICD-10-CM

## 2025-05-22 PROCEDURE — G2211 COMPLEX E/M VISIT ADD ON: CPT

## 2025-05-22 PROCEDURE — 99214 OFFICE O/P EST MOD 30 MIN: CPT

## 2025-06-05 ENCOUNTER — APPOINTMENT (OUTPATIENT)
Dept: UROLOGY | Facility: CLINIC | Age: 80
End: 2025-06-05
Payer: SUBSIDIZED

## 2025-06-05 PROCEDURE — G2211 COMPLEX E/M VISIT ADD ON: CPT

## 2025-06-05 PROCEDURE — 51741 ELECTRO-UROFLOWMETRY FIRST: CPT

## 2025-06-05 PROCEDURE — 99213 OFFICE O/P EST LOW 20 MIN: CPT

## 2025-06-05 PROCEDURE — 51798 US URINE CAPACITY MEASURE: CPT

## 2025-06-14 ENCOUNTER — RX RENEWAL (OUTPATIENT)
Age: 80
End: 2025-06-14

## 2025-06-16 ENCOUNTER — RX RENEWAL (OUTPATIENT)
Age: 80
End: 2025-06-16

## 2025-06-18 ENCOUNTER — APPOINTMENT (OUTPATIENT)
Dept: PULMONOLOGY | Facility: CLINIC | Age: 80
End: 2025-06-18

## 2025-06-19 ENCOUNTER — APPOINTMENT (OUTPATIENT)
Dept: OTOLARYNGOLOGY | Facility: CLINIC | Age: 80
End: 2025-06-19

## 2025-06-19 VITALS
HEIGHT: 70 IN | WEIGHT: 230 LBS | BODY MASS INDEX: 32.93 KG/M2 | HEART RATE: 64 BPM | TEMPERATURE: 97.4 F | OXYGEN SATURATION: 95 %

## 2025-06-19 PROCEDURE — 69210 REMOVE IMPACTED EAR WAX UNI: CPT

## 2025-06-19 PROCEDURE — 99213 OFFICE O/P EST LOW 20 MIN: CPT | Mod: 25

## 2025-06-24 ENCOUNTER — APPOINTMENT (OUTPATIENT)
Dept: PULMONOLOGY | Facility: CLINIC | Age: 80
End: 2025-06-24
Payer: MEDICARE

## 2025-06-24 VITALS
BODY MASS INDEX: 32.93 KG/M2 | OXYGEN SATURATION: 98 % | RESPIRATION RATE: 14 BRPM | HEIGHT: 70 IN | WEIGHT: 230 LBS | SYSTOLIC BLOOD PRESSURE: 138 MMHG | TEMPERATURE: 97.8 F | HEART RATE: 68 BPM | DIASTOLIC BLOOD PRESSURE: 82 MMHG

## 2025-06-24 PROCEDURE — 95012 NITRIC OXIDE EXP GAS DETER: CPT

## 2025-06-24 PROCEDURE — 94729 DIFFUSING CAPACITY: CPT

## 2025-06-24 PROCEDURE — 99214 OFFICE O/P EST MOD 30 MIN: CPT | Mod: 25

## 2025-06-24 PROCEDURE — 94726 PLETHYSMOGRAPHY LUNG VOLUMES: CPT

## 2025-06-24 PROCEDURE — 94010 BREATHING CAPACITY TEST: CPT

## 2025-06-25 ENCOUNTER — OFFICE (OUTPATIENT)
Dept: URBAN - METROPOLITAN AREA CLINIC 109 | Facility: CLINIC | Age: 80
Setting detail: OPHTHALMOLOGY
End: 2025-06-25
Payer: MEDICARE

## 2025-06-25 DIAGNOSIS — H40.013: ICD-10-CM

## 2025-06-25 DIAGNOSIS — H25.13: ICD-10-CM

## 2025-06-25 PROCEDURE — 99213 OFFICE O/P EST LOW 20 MIN: CPT | Performed by: OPHTHALMOLOGY

## 2025-06-25 PROCEDURE — 92133 CPTRZD OPH DX IMG PST SGM ON: CPT | Performed by: OPHTHALMOLOGY

## 2025-06-25 ASSESSMENT — PACHYMETRY
OS_CT_CORRECTION: -2
OD_CT_CORRECTION: -2
OS_CT_UM: 572
OD_CT_UM: 570

## 2025-06-25 ASSESSMENT — REFRACTION_CURRENTRX
OD_CYLINDER: -1.25
OD_AXIS: 123
OD_VPRISM_DIRECTION: PROGS
OD_SPHERE: +3.25
OS_CYLINDER: -1.25
OS_VPRISM_DIRECTION: PROGS
OD_SPHERE: +3.75
OS_OVR_VA: 20/
OS_SPHERE: +4.00
OD_AXIS: 115
OS_OVR_VA: 20/
OD_OVR_VA: 20/
OD_ADD: +1.00
OD_OVR_VA: 20/
OS_SPHERE: +4.00
OD_ADD: +2.25
OS_CYLINDER: -1.25
OS_ADD: +2.25
OS_AXIS: 91
OS_AXIS: 085
OS_ADD: +1.00
OD_CYLINDER: -1.25

## 2025-06-25 ASSESSMENT — KERATOMETRY
OD_K2POWER_DIOPTERS: 45.75
OD_AXISANGLE_DEGREES: 013
OS_AXISANGLE_DEGREES: 175
OD_K1POWER_DIOPTERS: 44.50
OS_K2POWER_DIOPTERS: 46.25
OS_K1POWER_DIOPTERS: 44.50

## 2025-06-25 ASSESSMENT — VISUAL ACUITY
OS_BCVA: 20/20
OD_BCVA: 20/NLP

## 2025-06-25 ASSESSMENT — REFRACTION_AUTOREFRACTION
OD_SPHERE: +4.25
OS_SPHERE: +3.50
OD_AXIS: 105
OS_AXIS: 092
OD_CYLINDER: -2.00
OS_CYLINDER: -2.00

## 2025-06-25 ASSESSMENT — CONFRONTATIONAL VISUAL FIELD TEST (CVF)
OS_FINDINGS: FULL
OD_FINDINGS: FULL

## 2025-06-25 ASSESSMENT — TONOMETRY
OS_IOP_MMHG: 16
OD_IOP_MMHG: 15

## 2025-07-01 ENCOUNTER — OFFICE (OUTPATIENT)
Dept: URBAN - METROPOLITAN AREA CLINIC 109 | Facility: CLINIC | Age: 80
Setting detail: OPHTHALMOLOGY
End: 2025-07-01
Payer: MEDICARE

## 2025-07-01 DIAGNOSIS — H52.4: ICD-10-CM

## 2025-07-01 PROCEDURE — 92015 DETERMINE REFRACTIVE STATE: CPT | Performed by: OPTOMETRIST

## 2025-07-01 ASSESSMENT — REFRACTION_CURRENTRX
OD_OVR_VA: 20/
OD_AXIS: 113
OS_AXIS: 91
OD_ADD: +1.00
OS_AXIS: 102
OD_AXIS: 115
OD_CYLINDER: -1.00
OS_CYLINDER: -1.50
OS_VPRISM_DIRECTION: PROGS
OD_CYLINDER: -1.25
OS_OVR_VA: 20/
OD_VPRISM_DIRECTION: PROGS
OD_SPHERE: +3.25
OD_SPHERE: +3.75
OD_ADD: +2.50
OS_OVR_VA: 20/
OS_ADD: +2.50
OS_SPHERE: +4.25
OS_ADD: +1.00
OS_CYLINDER: -1.25
OS_SPHERE: +4.00
OD_OVR_VA: 20/

## 2025-07-01 ASSESSMENT — TONOMETRY
OD_IOP_MMHG: 12
OS_IOP_MMHG: 16

## 2025-07-01 ASSESSMENT — KERATOMETRY
OS_K2POWER_DIOPTERS: 46.25
OS_AXISANGLE_DEGREES: 175
OS_K1POWER_DIOPTERS: 44.50
OD_AXISANGLE_DEGREES: 013
OD_K2POWER_DIOPTERS: 45.75
OD_K1POWER_DIOPTERS: 44.50

## 2025-07-01 ASSESSMENT — PACHYMETRY
OD_CT_CORRECTION: -2
OS_CT_UM: 572
OD_CT_UM: 570
OS_CT_CORRECTION: -2

## 2025-07-01 ASSESSMENT — REFRACTION_MANIFEST
OS_ADD: +2.50
OD_SPHERE: +4.50
OD_ADD: +2.50
OD_CYLINDER: -2.00
OD_AXIS: 105
OS_SPHERE: +4.50
OS_AXIS: 105
OS_CYLINDER: -2.00

## 2025-07-01 ASSESSMENT — VISUAL ACUITY
OS_BCVA: 20/25-2
OD_BCVA: 20/NLP

## 2025-07-01 ASSESSMENT — REFRACTION_AUTOREFRACTION
OS_AXIS: 094
OD_SPHERE: +4.25
OS_SPHERE: +4.00
OS_CYLINDER: -2.00
OD_CYLINDER: -2.00
OD_AXIS: 106

## 2025-07-01 ASSESSMENT — CONFRONTATIONAL VISUAL FIELD TEST (CVF)
OS_FINDINGS: FULL
OD_FINDINGS: FULL

## 2025-07-03 ENCOUNTER — OUTPATIENT (OUTPATIENT)
Dept: OUTPATIENT SERVICES | Facility: HOSPITAL | Age: 80
LOS: 1 days | End: 2025-07-03
Payer: MEDICARE

## 2025-07-03 ENCOUNTER — APPOINTMENT (OUTPATIENT)
Dept: CT IMAGING | Facility: HOSPITAL | Age: 80
End: 2025-07-03

## 2025-07-03 DIAGNOSIS — Z98.890 OTHER SPECIFIED POSTPROCEDURAL STATES: Chronic | ICD-10-CM

## 2025-07-03 DIAGNOSIS — J44.89 OTHER SPECIFIED CHRONIC OBSTRUCTIVE PULMONARY DISEASE: ICD-10-CM

## 2025-07-03 DIAGNOSIS — Z96.652 PRESENCE OF LEFT ARTIFICIAL KNEE JOINT: Chronic | ICD-10-CM

## 2025-07-03 PROCEDURE — 71250 CT THORAX DX C-: CPT

## 2025-07-03 PROCEDURE — 71250 CT THORAX DX C-: CPT | Mod: 26

## 2025-07-08 ENCOUNTER — APPOINTMENT (OUTPATIENT)
Dept: PULMONOLOGY | Facility: CLINIC | Age: 80
End: 2025-07-08
Payer: MEDICARE

## 2025-07-08 VITALS
OXYGEN SATURATION: 98 % | HEIGHT: 70 IN | BODY MASS INDEX: 32.93 KG/M2 | WEIGHT: 230 LBS | TEMPERATURE: 97.3 F | HEART RATE: 50 BPM

## 2025-07-08 PROBLEM — R06.2 WHEEZING ON EXHALATION: Status: ACTIVE | Noted: 2025-07-08

## 2025-07-08 PROCEDURE — 99213 OFFICE O/P EST LOW 20 MIN: CPT | Mod: 25

## 2025-07-08 PROCEDURE — 31575 DIAGNOSTIC LARYNGOSCOPY: CPT

## 2025-07-11 ENCOUNTER — APPOINTMENT (OUTPATIENT)
Dept: PULMONOLOGY | Facility: CLINIC | Age: 80
End: 2025-07-11
Payer: MEDICARE

## 2025-07-11 VITALS
DIASTOLIC BLOOD PRESSURE: 80 MMHG | TEMPERATURE: 97.2 F | OXYGEN SATURATION: 97 % | SYSTOLIC BLOOD PRESSURE: 126 MMHG | HEART RATE: 54 BPM | HEIGHT: 70 IN | BODY MASS INDEX: 32.93 KG/M2 | WEIGHT: 230 LBS

## 2025-07-11 PROCEDURE — G2211 COMPLEX E/M VISIT ADD ON: CPT

## 2025-07-11 PROCEDURE — 99213 OFFICE O/P EST LOW 20 MIN: CPT

## 2025-07-14 ENCOUNTER — RX RENEWAL (OUTPATIENT)
Age: 80
End: 2025-07-14

## 2025-07-21 ENCOUNTER — RX RENEWAL (OUTPATIENT)
Age: 80
End: 2025-07-21

## 2025-07-22 ENCOUNTER — RX RENEWAL (OUTPATIENT)
Age: 80
End: 2025-07-22

## 2025-07-24 ENCOUNTER — RX RENEWAL (OUTPATIENT)
Age: 80
End: 2025-07-24

## 2025-08-02 ENCOUNTER — RX RENEWAL (OUTPATIENT)
Age: 80
End: 2025-08-02

## 2025-08-21 ENCOUNTER — APPOINTMENT (OUTPATIENT)
Dept: PULMONOLOGY | Facility: CLINIC | Age: 80
End: 2025-08-21
Payer: MEDICARE

## 2025-08-21 VITALS
TEMPERATURE: 97.5 F | SYSTOLIC BLOOD PRESSURE: 124 MMHG | HEART RATE: 65 BPM | DIASTOLIC BLOOD PRESSURE: 78 MMHG | BODY MASS INDEX: 33.5 KG/M2 | OXYGEN SATURATION: 97 % | RESPIRATION RATE: 14 BRPM | HEIGHT: 70 IN | WEIGHT: 234 LBS

## 2025-08-21 DIAGNOSIS — J44.9 CHRONIC OBSTRUCTIVE PULMONARY DISEASE, UNSPECIFIED: ICD-10-CM

## 2025-08-21 PROCEDURE — 99213 OFFICE O/P EST LOW 20 MIN: CPT

## 2025-08-21 PROCEDURE — G2211 COMPLEX E/M VISIT ADD ON: CPT

## 2025-09-04 ENCOUNTER — APPOINTMENT (OUTPATIENT)
Dept: NEUROLOGY | Facility: CLINIC | Age: 80
End: 2025-09-04
Payer: MEDICARE

## 2025-09-04 VITALS
HEART RATE: 70 BPM | SYSTOLIC BLOOD PRESSURE: 157 MMHG | DIASTOLIC BLOOD PRESSURE: 78 MMHG | HEIGHT: 70 IN | WEIGHT: 230 LBS | BODY MASS INDEX: 32.93 KG/M2

## 2025-09-04 DIAGNOSIS — G62.9 POLYNEUROPATHY, UNSPECIFIED: ICD-10-CM

## 2025-09-04 PROCEDURE — 99215 OFFICE O/P EST HI 40 MIN: CPT

## 2025-09-04 PROCEDURE — G2211 COMPLEX E/M VISIT ADD ON: CPT

## 2025-09-16 ENCOUNTER — APPOINTMENT (OUTPATIENT)
Dept: PULMONOLOGY | Facility: CLINIC | Age: 80
End: 2025-09-16
Payer: MEDICARE

## 2025-09-16 DIAGNOSIS — H61.20 IMPACTED CERUMEN, UNSPECIFIED EAR: ICD-10-CM

## 2025-09-16 PROCEDURE — 69210 REMOVE IMPACTED EAR WAX UNI: CPT

## (undated) DEVICE — BASIN SET DOUBLE

## (undated) DEVICE — TUBING IV EXTENSION MICROBORE W MICROCLAVE 7"

## (undated) DEVICE — FOLEY CATH 2-WAY 16FR 5CC SILICONE

## (undated) DEVICE — FOLEY CATH 2-WAY 16FR 5CC LATEX COUDE RED

## (undated) DEVICE — FOLEY HOLDER STATLOCK 2 WAY ADULT

## (undated) DEVICE — DRAINAGE BAG URINARY 2L